# Patient Record
Sex: FEMALE | Race: WHITE | Employment: PART TIME | URBAN - METROPOLITAN AREA
[De-identification: names, ages, dates, MRNs, and addresses within clinical notes are randomized per-mention and may not be internally consistent; named-entity substitution may affect disease eponyms.]

---

## 2018-08-22 ENCOUNTER — OFFICE VISIT (OUTPATIENT)
Dept: FAMILY MEDICINE CLINIC | Age: 58
End: 2018-08-22
Payer: COMMERCIAL

## 2018-08-22 ENCOUNTER — OFFICE VISIT (OUTPATIENT)
Dept: PODIATRY | Age: 58
End: 2018-08-22
Payer: COMMERCIAL

## 2018-08-22 ENCOUNTER — HOSPITAL ENCOUNTER (OUTPATIENT)
Age: 58
Discharge: HOME | End: 2018-08-22
Payer: COMMERCIAL

## 2018-08-22 VITALS
RESPIRATION RATE: 16 BRPM | WEIGHT: 152.8 LBS | HEART RATE: 62 BPM | TEMPERATURE: 97.9 F | DIASTOLIC BLOOD PRESSURE: 49 MMHG | BODY MASS INDEX: 22.63 KG/M2 | HEIGHT: 69 IN | SYSTOLIC BLOOD PRESSURE: 107 MMHG

## 2018-08-22 VITALS — WEIGHT: 150 LBS | BODY MASS INDEX: 22.22 KG/M2 | HEIGHT: 69 IN

## 2018-08-22 DIAGNOSIS — E03.9 HYPOTHYROIDISM, UNSPECIFIED TYPE: ICD-10-CM

## 2018-08-22 DIAGNOSIS — M79.671 PAIN IN BOTH FEET: ICD-10-CM

## 2018-08-22 DIAGNOSIS — Z23 NEED FOR SHINGLES VACCINE: ICD-10-CM

## 2018-08-22 DIAGNOSIS — M92.61 HAGLUND'S DEFORMITY OF BOTH HEELS: Primary | ICD-10-CM

## 2018-08-22 DIAGNOSIS — M92.62 HAGLUND'S DEFORMITY OF BOTH HEELS: Primary | ICD-10-CM

## 2018-08-22 DIAGNOSIS — Z11.59 NEED FOR HEPATITIS C SCREENING TEST: ICD-10-CM

## 2018-08-22 DIAGNOSIS — Z76.89 ENCOUNTER TO ESTABLISH CARE: ICD-10-CM

## 2018-08-22 DIAGNOSIS — M79.672 PAIN IN BOTH FEET: ICD-10-CM

## 2018-08-22 DIAGNOSIS — Z11.4 SCREENING FOR HUMAN IMMUNODEFICIENCY VIRUS WITHOUT PRESENCE OF RISK FACTORS: ICD-10-CM

## 2018-08-22 DIAGNOSIS — Z11.4 SCREENING FOR HUMAN IMMUNODEFICIENCY VIRUS WITHOUT PRESENCE OF RISK FACTORS: Primary | ICD-10-CM

## 2018-08-22 LAB
HEPATITIS C ANTIBODY: NONREACTIVE
HIV AG/AB: NONREACTIVE
TSH SERPL DL<=0.05 MIU/L-ACNC: 2.67 MIU/L (ref 0.3–5)

## 2018-08-22 PROCEDURE — 99204 OFFICE O/P NEW MOD 45 MIN: CPT | Performed by: PHYSICIAN ASSISTANT

## 2018-08-22 PROCEDURE — G8420 CALC BMI NORM PARAMETERS: HCPCS | Performed by: PODIATRIST

## 2018-08-22 PROCEDURE — 1036F TOBACCO NON-USER: CPT | Performed by: PHYSICIAN ASSISTANT

## 2018-08-22 PROCEDURE — G8420 CALC BMI NORM PARAMETERS: HCPCS | Performed by: PHYSICIAN ASSISTANT

## 2018-08-22 PROCEDURE — G8427 DOCREV CUR MEDS BY ELIG CLIN: HCPCS | Performed by: PHYSICIAN ASSISTANT

## 2018-08-22 PROCEDURE — 86803 HEPATITIS C AB TEST: CPT

## 2018-08-22 PROCEDURE — 3017F COLORECTAL CA SCREEN DOC REV: CPT | Performed by: PODIATRIST

## 2018-08-22 PROCEDURE — 36415 COLL VENOUS BLD VENIPUNCTURE: CPT

## 2018-08-22 PROCEDURE — G8427 DOCREV CUR MEDS BY ELIG CLIN: HCPCS | Performed by: PODIATRIST

## 2018-08-22 PROCEDURE — 99203 OFFICE O/P NEW LOW 30 MIN: CPT | Performed by: PODIATRIST

## 2018-08-22 PROCEDURE — G0444 DEPRESSION SCREEN ANNUAL: HCPCS | Performed by: PHYSICIAN ASSISTANT

## 2018-08-22 PROCEDURE — 87389 HIV-1 AG W/HIV-1&-2 AB AG IA: CPT

## 2018-08-22 PROCEDURE — 3017F COLORECTAL CA SCREEN DOC REV: CPT | Performed by: PHYSICIAN ASSISTANT

## 2018-08-22 PROCEDURE — 1036F TOBACCO NON-USER: CPT | Performed by: PODIATRIST

## 2018-08-22 PROCEDURE — 84443 ASSAY THYROID STIM HORMONE: CPT

## 2018-08-22 ASSESSMENT — PATIENT HEALTH QUESTIONNAIRE - PHQ9
SUM OF ALL RESPONSES TO PHQ QUESTIONS 1-9: 0
10. IF YOU CHECKED OFF ANY PROBLEMS, HOW DIFFICULT HAVE THESE PROBLEMS MADE IT FOR YOU TO DO YOUR WORK, TAKE CARE OF THINGS AT HOME, OR GET ALONG WITH OTHER PEOPLE: 0
6. FEELING BAD ABOUT YOURSELF - OR THAT YOU ARE A FAILURE OR HAVE LET YOURSELF OR YOUR FAMILY DOWN: 0
9. THOUGHTS THAT YOU WOULD BE BETTER OFF DEAD, OR OF HURTING YOURSELF: 0
5. POOR APPETITE OR OVEREATING: 0
4. FEELING TIRED OR HAVING LITTLE ENERGY: 0
8. MOVING OR SPEAKING SO SLOWLY THAT OTHER PEOPLE COULD HAVE NOTICED. OR THE OPPOSITE, BEING SO FIGETY OR RESTLESS THAT YOU HAVE BEEN MOVING AROUND A LOT MORE THAN USUAL: 0
1. LITTLE INTEREST OR PLEASURE IN DOING THINGS: 0
SUM OF ALL RESPONSES TO PHQ9 QUESTIONS 1 & 2: 0
3. TROUBLE FALLING OR STAYING ASLEEP: 0
2. FEELING DOWN, DEPRESSED OR HOPELESS: 0
SUM OF ALL RESPONSES TO PHQ QUESTIONS 1-9: 0
7. TROUBLE CONCENTRATING ON THINGS, SUCH AS READING THE NEWSPAPER OR WATCHING TELEVISION: 0

## 2018-08-22 ASSESSMENT — ENCOUNTER SYMPTOMS
SINUS PRESSURE: 0
COUGH: 0
SORE THROAT: 0
ABDOMINAL PAIN: 0
ABDOMINAL DISTENTION: 0
EYE REDNESS: 0
VOMITING: 0
DIARRHEA: 0
BACK PAIN: 0
NAUSEA: 0
COLOR CHANGE: 0
PHOTOPHOBIA: 0
SHORTNESS OF BREATH: 0
BLOOD IN STOOL: 0
CHEST TIGHTNESS: 0
CONSTIPATION: 0
WHEEZING: 0

## 2018-08-22 NOTE — PROGRESS NOTES
1960    HIV screen  11/20/1977    DTaP/Tdap/Td vaccine (1 - Tdap) 11/20/1981    Cervical cancer screen  11/20/1983    Lipid screen  11/20/2002    Breast cancer screen  11/20/2012    Shingles Vaccine (1 of 2 - 2 Dose Series) 11/20/2012    Colon cancer screen colonoscopy  11/20/2012       Allergies   Allergen Reactions    Terconazole          Current Outpatient Prescriptions   Medication Sig Dispense Refill    zoster recombinant adjuvanted vaccine (SHINGRIX) 50 MCG SUSR injection Inject 0.5 mLs into the muscle once for 1 dose 0.5 mL 0     No current facility-administered medications for this visit. Social History   Substance Use Topics    Smoking status: Never Smoker    Smokeless tobacco: Never Used    Alcohol use Yes       History reviewed. No pertinent family history. ______________________________________________________________________  Review of Systems   Constitutional: Negative for activity change, appetite change, chills, diaphoresis, fatigue, fever and unexpected weight change. HENT: Negative for congestion, sinus pressure and sore throat. Eyes: Negative for photophobia and redness. Respiratory: Negative for cough, chest tightness, shortness of breath and wheezing. Cardiovascular: Negative for chest pain, palpitations and leg swelling. Gastrointestinal: Negative for abdominal distention, abdominal pain, blood in stool, constipation, diarrhea, nausea and vomiting. Endocrine: Negative. Genitourinary: Negative for decreased urine volume, difficulty urinating, dysuria, frequency, hematuria and urgency. Musculoskeletal: Negative for back pain and gait problem. Skin: Negative for color change, pallor and rash. Neurological: Negative for dizziness, syncope, weakness, light-headedness, numbness and headaches. Hematological: Negative for adenopathy. Psychiatric/Behavioral: Negative for confusion, decreased concentration, hallucinations and sleep disturbance. been identified and corrected by editing.

## 2018-08-22 NOTE — PROGRESS NOTES
Visit Information    Have you changed or started any medications since your last visit including any over-the-counter medicines, vitamins, or herbal medicines? no   Are you having any side effects from any of your medications? -  no  Have you stopped taking any of your medications? Is so, why? -  no    Have you seen any other physician or provider since your last visit? No  Have you had any other diagnostic tests since your last visit? No  Have you been seen in the emergency room and/or had an admission to a hospital since we last saw you? No  Have you had your routine dental cleaning in the past 6 months? yes -     Have you activated your [employer] account? If not, what are your barriers?  No:      Patient Care Team:  Regina Adrian, 51[address] Kim as PCP - General (Physician Assistant)    Medical History Review  Past Medical, Family, and Social History reviewed and does not contribute to the patient presenting condition    Health Maintenance   Topic Date Due    Hepatitis C screen  1960    HIV screen  11/20/1977    DTaP/Tdap/Td vaccine (1 - Tdap) 11/20/1981    Cervical cancer screen  11/20/1983    Lipid screen  11/20/2002    Breast cancer screen  11/20/2012    Shingles Vaccine (1 of 2 - 2 Dose Series) 11/20/2012    Colon cancer screen colonoscopy  11/20/2012    Flu vaccine (1) 09/01/2018

## 2018-08-27 NOTE — PROGRESS NOTES
[address] [facility] [address] [facility]  [address]  Dept: [phone]    NEW PATIENT PROGRESS NOTE  Date of patient's visit: 8/27/2018  Patient's Name:  Estela Maharaj YOB: 1960            Patient Care Team:  Xu Bowman MD as PCP - General (Internal Medicine)  Taya Lei DPM as Consulting Physician (Podiatry)        Chief Complaint   Patient presents with    New Patient     heel pain         HPI: Estela Maharaj is a 56 y.o. female who presents to the office today complaining of pain to the back of both of her heels. Symptoms began 2 month(s) ago. Patient relates pain is Present. Pain is rated 5 out of 10 and is described as constant. Treatments prior to today's visit include: pads and changing shoes. States it hurts when walking down stairs and when doing heel lifts. States it hurts to drive when her heel is resting . Currently denies F/C/N/V. Allergies   Allergen Reactions    Terconazole        Past Medical History:   Diagnosis Date    Hypothyroidism 8/22/2018       Prior to Admission medications    Not on File       History reviewed. No pertinent surgical history. History reviewed. No pertinent family history. Social History   Substance Use Topics    Smoking status: Never Smoker    Smokeless tobacco: Never Used    Alcohol use Yes       Review of Systems  Review of Systems - History obtained from chart review and the patient  General ROS: negative for - chills, fatigue, fever, night sweats or weight gain  Constitutional: Negative for chills, diaphoresis, fatigue, fever and unexpected weight change. Musculoskeletal: Positive for arthralgias, gait problem and joint swelling. Neurological ROS: negative for - behavioral changes, confusion, headaches or seizures. Negative for weakness and numbness. Dermatological ROS: negative for - mole changes, rash  Cardiovascular: Negative for leg swelling.    Gastrointestinal: Negative for constipation, diarrhea, nausea and vomiting. Lower Extremity Physical Examination:     Vitals: There were no vitals filed for this visit. General: AAO x 3 in NAD. Dermatologic Exam:  Skin lesion/ulceration Absent . Skin No rashes or nodules noted. .       Musculoskeletal:     1st MPJ ROM decreased, Bilateral.  Muscle strength 5/5, Bilateral.  Pain present upon palpation of  Right and left posterio rheel. Pain increased w Dorsiflexion of ankle joint nazanin. Medial longitudinal arch, Bilateral WNL. Ankle ROM WNL,Bilateral.    Dorsally contracted digits absent digits 1-5 Bilateral.     Vascular: DP and PT pulses palpable 2/4, Bilateral.  CFT <3 seconds, Bilateral.  Hair growth present to the level of the digits, Bilateral.  Edema absent, Bilateral.  Varicosities absent, Bilateral. Erythema absent, Bilateral    Neurological: Sensation intact to light touch to level of digits, Bilateral.  Protective sensation intact 10/10 sites via 5.07/10g [place] Monofilament, Bilateral.  negative Tinel's, Bilateral.  negative Valleix sign, Bilateral.      Integument: Warm, dry, supple, Bilateral.  Open lesion absent, Bilateral.  Interdigital maceration absent to web spaces 1-4, Bilateral.  Nails are normal in length, thickness and color 1-5 bilateral.  Fissures absent, Bilateral.     Radiographs:  3 views right and left  foot: large bony prominence noted with thickening of achilles tendon. Small posterior spur both feet noted on lateral    Asessment: Patient is a 56 y.o. female with:    Diagnosis Orders   1. Mason's deformity of both heels  Ambulatory referral to Physical Therapy   2. Pain in both feet  Ambulatory referral to Physical Therapy       Plan: Patient examined and evaluated. Current condition and treatment options discussed in detail. Discussed conservative and surgical options with the patient. Advised pt to her condtino.   rx to start dry needling at physical therapy  X rays 3 views both feet show the above results. .  Verbal and written instructions given to patient. Contact office with any questions/problems/concerns. RTC in 2week(s).   Heel lifts added to shoes tdoay 1/4 in  8/22/2018    Electronically signed by Vasu Rao DPM on 8/27/2018 at 1:33 PM  8/22/2018

## 2018-09-10 ENCOUNTER — HOSPITAL ENCOUNTER (OUTPATIENT)
Dept: PHYSICAL THERAPY | Facility: CLINIC | Age: 58
Setting detail: THERAPIES SERIES
Discharge: HOME | End: 2018-09-10
Payer: COMMERCIAL

## 2018-09-10 PROCEDURE — 97110 THERAPEUTIC EXERCISES: CPT

## 2018-09-10 PROCEDURE — 97140 MANUAL THERAPY 1/> REGIONS: CPT

## 2018-09-10 PROCEDURE — 97161 PT EVAL LOW COMPLEX 20 MIN: CPT

## 2018-09-10 NOTE — CONSULTS
[] [] []   Standing [] [x] []   Ambulation [] [x] []   Groom/Dress [] [] []   Lift/Carry [] [] []   Stairs [] [] []   Bending [] [x] []   Squat [] [x] []   Kneel [] [] []         Comments:  Assessment:  Problems:    [x] ? Pain: B heels   [x] ? ROM:B hips, calves    [x] ? Strength: B hips   [x] ? Function: incr pain/difficulty w/ walking, standing, steps, squatting, Adv ADLs   [] ? Balance  [] Edema:  [] Postural Deviations  [x] Gait Deviations  [x] Other: LEFS-66%      STG: (to be met in 12 treatments)  1. ? Pain: 2/10 w/ standing/walking  2. ? ROM: R ankle WNL  3. ? Strength: Bilat hips grossly 5/5 to allow for incr ease w/ squats, steps and gait  4. ? Function: Able ambulate community distances w/o incr pain or difficulty   5. Independent with Home Exercise Programs  6. Incr LEFS by 20% for improved function                 Patient goals: Pain relief and tightness of R leg      Rehab Potential:  [x] Good  [] Fair  [] Poor   Suggested Professional Referral:  [x] No  [] Yes:  Barriers to Goal Achievement[de-identified]  [x] No  [] Yes:  Domestic Concerns:  [x] No  [] Yes:    Pt. Education:  [x] Plans/Goals, Risks/Benefits discussed  [x] Home exercise program    Method of Education: [x] Verbal  [x] Demo  [x] Written  Comprehension of Education:  [x] Verbalizes understanding. [] Demonstrates understanding. [] Needs Review. [] Demonstrates/verbalizes understanding of HEP/Ed previously given.     Treatment Plan:  [x] Therapeutic Exercise    [x] Modalities:prn  [x] Dry Needling  [x] Therapeutic Activity    [] Ultrasound  [] Electrical Stimulation  [] Gait Training     [] Massage       [] Lumbar/Cervical Traction  [x] Neuromuscular Re-education [x] Cold/hotpack [] Iontophoresis: 4 mg/mL  [x] Instruction in HEP             Dexamethasone Sodium  [x] Manual Therapy             Phosphate 40-80 mAmin  [] Aquatic Therapy  [x] Vasocompression/    [] Other:       Game Ready    []  Medication allergies reviewed for use of    Dexamethasone

## 2018-09-13 ENCOUNTER — HOSPITAL ENCOUNTER (OUTPATIENT)
Dept: PHYSICAL THERAPY | Facility: CLINIC | Age: 58
Setting detail: THERAPIES SERIES
Discharge: HOME | End: 2018-09-13
Payer: COMMERCIAL

## 2018-09-13 PROCEDURE — 97110 THERAPEUTIC EXERCISES: CPT

## 2018-09-13 PROCEDURE — 97140 MANUAL THERAPY 1/> REGIONS: CPT

## 2018-09-13 NOTE — FLOWSHEET NOTE
[] [facility]       Outpatient Physical        Therapy       [address]       Phone: [phone]       Fax: [phone] [] [facility] at [address]       Phone: [phone]       Fax: [phone] [x] [place]. [facility]   Phone: [phone]   Fax:  [phone]     Physical Therapy Daily Treatment Note    Date:  2018  Patient Name:  Aimee Sepulveda    :  1960  MRN: [id number]  Physician: [address] [facility] [address]: [facility] 20 visit hard max  Medical Diagnosis: Mason's deformity of both heels, Pain in both feet                 Rehab Codes: M92.61, M92.62, M79.671, M79.672   Onset date:                     Next Dr's appt.: prn  Visit# / total visits:   Cancels/No Shows: 2    Subjective:  Pt reports she was sore after last visit not to bad today as didn't have to stand a lot at work. Reports compliance w/ HEP and did yoga for first time in a while and felt tight. Pain:  [x] Yes  [] No Location: B heels Pain Rating: (0-10 scale) 3-4/10  Pain altered Tx:  [] No  [x] Yes  Action:  Comments:    Objective:  Modalities: prn  Precautions:  Exercises:IDN B calf and heel, homeostatics. MFR to bilat achilles, calf  Exercise Reps/ Time Weight/ Level Comments         HS stretch 3x 30 sec  w/ rope   Calf stretch 3x ea 30 sec  w/ towel and at wall   ABC 1x     Circles-cw, ccw 20x ea     Seated heel, toe raises 10x2 ea     Toe crunches 10x ea  HEP         Ankle Tband      Baps      Other:    Specific Instructions for next treatment:asses response to IDN, add cupping, manual prn and progress strength and ROM as jacinto.      Treatment Charges: Mins Units   []  Modalities     [x]  Ther Exercise 15 1   [x]  Manual Therapy 30 2   []  Ther Activities     []  Aquatics     []  Vasocompression     []  Other     Total Treatment time 45 3       Assessment: [x] Progressing

## 2018-09-18 ENCOUNTER — HOSPITAL ENCOUNTER (OUTPATIENT)
Dept: PHYSICAL THERAPY | Facility: CLINIC | Age: 58
Setting detail: THERAPIES SERIES
Discharge: HOME | End: 2018-09-18
Payer: COMMERCIAL

## 2018-09-18 PROCEDURE — 97140 MANUAL THERAPY 1/> REGIONS: CPT

## 2018-09-18 PROCEDURE — 97110 THERAPEUTIC EXERCISES: CPT

## 2018-09-18 PROCEDURE — 97112 NEUROMUSCULAR REEDUCATION: CPT

## 2018-09-18 NOTE — FLOWSHEET NOTE
Progressing toward goals. [] No change. [x] Other: IDN w/ incr dosage this date to B LE's followed by manual to bilat calf. Added trial of taping to R calf this date, educated pt on use and removal of tape. Completed therex per log w/ progressions noted w/ good jacinto. No incr pain or soreness noted after RX. Issued red tband and HO of new ex for HEP. Will progress as jacinto. STG: (to be met in 12 treatments)  1. ? Pain: 2/10 w/ standing/walking  2. ? ROM: R ankle WNL  3. ? Strength: Bilat hips grossly 5/5 to allow for incr ease w/ squats, steps and gait  4. ? Function: Able ambulate community distances w/o incr pain or difficulty   5. Independent with Home Exercise Programs  6. Incr LEFS by 20% for improved function    Pt. Education:  [x] Yes  [] No  [x] Reviewed Prior HEP/Ed  Method of Education: [x] Verbal  [x] Demo  [x] Written  Comprehension of Education:  [x] Verbalizes understanding. [] Demonstrates understanding. [] Needs review. [] Demonstrates/verbalizes HEP/Ed previously given. Plan: [x] Continue per plan of care.    [] Other:      Time In: 4:00 pm            Time Out: 5:00 pm    Electronically signed by:  Estrellita Roy, PT

## 2018-09-20 ENCOUNTER — HOSPITAL ENCOUNTER (OUTPATIENT)
Dept: PHYSICAL THERAPY | Facility: CLINIC | Age: 58
Setting detail: THERAPIES SERIES
Discharge: HOME | End: 2018-09-20
Payer: COMMERCIAL

## 2018-09-20 PROCEDURE — 97140 MANUAL THERAPY 1/> REGIONS: CPT

## 2018-09-20 PROCEDURE — 97110 THERAPEUTIC EXERCISES: CPT

## 2018-09-20 PROCEDURE — 97112 NEUROMUSCULAR REEDUCATION: CPT

## 2018-09-20 NOTE — FLOWSHEET NOTE
Vasocompression     [x]  Other NMR 10 1   Total Treatment time 55 4       Assessment: [x] Progressing toward goals. [] No change. [x] Other: IDN w/ incr dosage this date to B LE's followed by manual to bilat calf. Taping to R calf as prev, followed by therex per log w/ progressions noted, jacinto well. No incr pain or soreness noted after RX. Will cont to progress as jacinto. STG: (to be met in 12 treatments)  1. ? Pain: 2/10 w/ standing/walking  2. ? ROM: R ankle WNL  3. ? Strength: Bilat hips grossly 5/5 to allow for incr ease w/ squats, steps and gait  4. ? Function: Able ambulate community distances w/o incr pain or difficulty   5. Independent with Home Exercise Programs  6. Incr LEFS by 20% for improved function    Pt. Education:  [x] Yes  [] No  [x] Reviewed Prior HEP/Ed  Method of Education: [x] Verbal  [x] Demo  [x] Written  Comprehension of Education:  [x] Verbalizes understanding. [] Demonstrates understanding. [] Needs review. [] Demonstrates/verbalizes HEP/Ed previously given. Plan: [x] Continue per plan of care.    [] Other:      Time In: 5:00 pm            Time Out: 6:00 pm    Electronically signed by:  Tree Galaviz, PT

## 2018-09-25 ENCOUNTER — HOSPITAL ENCOUNTER (OUTPATIENT)
Dept: PHYSICAL THERAPY | Facility: CLINIC | Age: 58
Setting detail: THERAPIES SERIES
Discharge: HOME | End: 2018-09-25
Payer: COMMERCIAL

## 2018-09-25 PROCEDURE — 97140 MANUAL THERAPY 1/> REGIONS: CPT

## 2018-09-25 PROCEDURE — 97110 THERAPEUTIC EXERCISES: CPT

## 2018-09-25 PROCEDURE — 97112 NEUROMUSCULAR REEDUCATION: CPT

## 2018-09-25 NOTE — FLOWSHEET NOTE
[]  Vasocompression     [x]  Other NMR 10 1   Total Treatment time 55 4       Assessment: [x] Progressing toward goals. [] No change. [x] Other: IDN w/ incr dosage this date to B LE's followed by manual to bilat calf. Taping to R calf as prev, followed by therex per log w/ progressions noted, jacinto well. No incr pain or soreness noted after RX. Will cont to progress as jacinto. STG: (to be met in 12 treatments)  1. ? Pain: 2/10 w/ standing/walking  2. ? ROM: R ankle WNL  3. ? Strength: Bilat hips grossly 5/5 to allow for incr ease w/ squats, steps and gait  4. ? Function: Able ambulate community distances w/o incr pain or difficulty   5. Independent with Home Exercise Programs  6. Incr LEFS by 20% for improved function    Pt. Education:  [x] Yes  [] No  [x] Reviewed Prior HEP/Ed  Method of Education: [x] Verbal  [x] Demo  [] Written  Comprehension of Education:  [x] Verbalizes understanding. [] Demonstrates understanding. [] Needs review. [] Demonstrates/verbalizes HEP/Ed previously given. Plan: [x] Continue per plan of care.    [] Other:      Time In: 4:00 pm            Time Out: 5:00 pm    Electronically signed by:  Christopher Thornton PT

## 2018-09-27 ENCOUNTER — HOSPITAL ENCOUNTER (OUTPATIENT)
Dept: PHYSICAL THERAPY | Facility: CLINIC | Age: 58
Setting detail: THERAPIES SERIES
Discharge: HOME | End: 2018-09-27
Payer: COMMERCIAL

## 2018-09-27 PROCEDURE — 97110 THERAPEUTIC EXERCISES: CPT

## 2018-09-27 PROCEDURE — 97112 NEUROMUSCULAR REEDUCATION: CPT

## 2018-09-27 PROCEDURE — 97140 MANUAL THERAPY 1/> REGIONS: CPT

## 2018-09-27 NOTE — FLOWSHEET NOTE
Vasocompression     [x]  Other NMR 10 1   Total Treatment time 55 4       Assessment: [x] Progressing toward goals. [] No change. [x] Other: IDN to B LE's followed by manual to bilat calf. Decr tension noted overall bilat calf and peroneals and no sig TTP except R insertion at heel. Taping to R calf as prev, followed by therex per log w/ progressions noted, jacinto well. No incr pain or soreness noted after RX. Will cont to progress as jacinto. STG: (to be met in 12 treatments)  1. ? Pain: 2/10 w/ standing/walking  2. ? ROM: R ankle WNL  3. ? Strength: Bilat hips grossly 5/5 to allow for incr ease w/ squats, steps and gait  4. ? Function: Able ambulate community distances w/o incr pain or difficulty   5. Independent with Home Exercise Programs  6. Incr LEFS by 20% for improved function    Pt. Education:  [x] Yes  [] No  [x] Reviewed Prior HEP/Ed  Method of Education: [x] Verbal  [x] Demo  [] Written  Comprehension of Education:  [x] Verbalizes understanding. [] Demonstrates understanding. [] Needs review. [] Demonstrates/verbalizes HEP/Ed previously given. Plan: [x] Continue per plan of care.    [] Other:      Time In: 4:00 pm            Time Out: 5:00 pm    Electronically signed by:  Sheria Goldberg, PT

## 2018-10-02 ENCOUNTER — HOSPITAL ENCOUNTER (OUTPATIENT)
Dept: PHYSICAL THERAPY | Facility: CLINIC | Age: 58
Setting detail: THERAPIES SERIES
Discharge: HOME | End: 2018-10-02
Payer: COMMERCIAL

## 2018-10-02 PROCEDURE — 97140 MANUAL THERAPY 1/> REGIONS: CPT

## 2018-10-02 PROCEDURE — 97110 THERAPEUTIC EXERCISES: CPT

## 2018-10-04 ENCOUNTER — HOSPITAL ENCOUNTER (OUTPATIENT)
Dept: PHYSICAL THERAPY | Facility: CLINIC | Age: 58
Setting detail: THERAPIES SERIES
Discharge: HOME | End: 2018-10-04
Payer: COMMERCIAL

## 2018-10-04 NOTE — FLOWSHEET NOTE
[] [facility]) [facility] &  Therapy  [address]  [phone]  F: [phone] [] [address] [facility] [address]   [address]  P: [phone]  F: [phone] [] [place]  [address] [facility]  P: [phone]  F: [phone] [] [address]  [facility]   [address]   [place]: [phone]  F: [phone]        Physical Therapy Cancel/No Show note    Date: 10/4/2018  Patient: Day Mckeon  : 1960  MRN: [id number]    Cancels/No Shows to date: 1cx    For today's appointment patient:  [x]  Cancelled  []  Rescheduled appointment  []  No-show     Reason given by patient:  []  Patient ill  [x]  Conflicting appointment  []  No transportation    []  Conflict with work  []  No reason given  []  Weather related  []  Other:      Comments:      []  Next appointment was confirmed    Electronically signed by: January Resendiz PT

## 2018-10-09 ENCOUNTER — APPOINTMENT (OUTPATIENT)
Dept: PHYSICAL THERAPY | Facility: CLINIC | Age: 58
End: 2018-10-09
Payer: COMMERCIAL

## 2018-10-18 ENCOUNTER — HOSPITAL ENCOUNTER (OUTPATIENT)
Dept: PHYSICAL THERAPY | Facility: CLINIC | Age: 58
Setting detail: THERAPIES SERIES
End: 2018-10-18
Payer: COMMERCIAL

## 2018-10-25 ENCOUNTER — HOSPITAL ENCOUNTER (OUTPATIENT)
Dept: PHYSICAL THERAPY | Facility: CLINIC | Age: 58
Setting detail: THERAPIES SERIES
Discharge: HOME | End: 2018-10-25
Payer: COMMERCIAL

## 2019-04-22 ENCOUNTER — OFFICE VISIT (OUTPATIENT)
Dept: FAMILY MEDICINE CLINIC | Age: 59
End: 2019-04-22
Payer: COMMERCIAL

## 2019-04-22 VITALS
DIASTOLIC BLOOD PRESSURE: 68 MMHG | HEART RATE: 59 BPM | BODY MASS INDEX: 23.7 KG/M2 | HEIGHT: 69 IN | WEIGHT: 160 LBS | RESPIRATION RATE: 16 BRPM | TEMPERATURE: 98.7 F | SYSTOLIC BLOOD PRESSURE: 119 MMHG

## 2019-04-22 DIAGNOSIS — M79.10 MYALGIA: Primary | ICD-10-CM

## 2019-04-22 DIAGNOSIS — Z78.0 MENOPAUSE: ICD-10-CM

## 2019-04-22 PROBLEM — E03.9 HYPOTHYROIDISM: Status: RESOLVED | Noted: 2018-08-22 | Resolved: 2019-04-22

## 2019-04-22 PROCEDURE — G8420 CALC BMI NORM PARAMETERS: HCPCS | Performed by: INTERNAL MEDICINE

## 2019-04-22 PROCEDURE — G8427 DOCREV CUR MEDS BY ELIG CLIN: HCPCS | Performed by: INTERNAL MEDICINE

## 2019-04-22 PROCEDURE — 99213 OFFICE O/P EST LOW 20 MIN: CPT | Performed by: INTERNAL MEDICINE

## 2019-04-22 PROCEDURE — 1036F TOBACCO NON-USER: CPT | Performed by: INTERNAL MEDICINE

## 2019-04-22 PROCEDURE — 3017F COLORECTAL CA SCREEN DOC REV: CPT | Performed by: INTERNAL MEDICINE

## 2019-04-22 ASSESSMENT — PATIENT HEALTH QUESTIONNAIRE - PHQ9
2. FEELING DOWN, DEPRESSED OR HOPELESS: 0
1. LITTLE INTEREST OR PLEASURE IN DOING THINGS: 0
SUM OF ALL RESPONSES TO PHQ QUESTIONS 1-9: 0
SUM OF ALL RESPONSES TO PHQ QUESTIONS 1-9: 0
SUM OF ALL RESPONSES TO PHQ9 QUESTIONS 1 & 2: 0

## 2019-04-22 NOTE — PROGRESS NOTES
[facility]    Progress Note    Date of patient's visit: 4/22/2019    Patient's Name:  Maia Miller    YOB: 1960            Patient Care Team:  Nguyen Long MD as PCP - General (Internal Medicine)  Sharlet Primrose, DPM as Consulting Physician (Podiatry)    REASON FOR VISIT: Routine outpatient follow     Chief Complaint   Patient presents with   [facility]    Hand Pain     right thumb twitching     Fatigue    Generalized Body Aches         HISTORY OF PRESENT ILLNESS:    History was obtained from the patient. Maia Miller is a 66 y.o. is here for generalized fatigue and achy feeling. She was also having some twitching in the right thumb a few weeks ago which has now resolved. She says she was taking a lot of cod liver oil and vitamin D supplementations along with other supplements which were recommended in the past.    She was also told she had an abnormal thyroid in the past though her last 2 TSH have been normal.  She had several labs done last year in June which were reviewed which were all normal.  She has been menopausal since last July though had one or 2 episodes of spotting in between. She follows up with her gynecologist.  She has some occasional hot flashes. LMP: last July    Past Medical History:   Diagnosis Date    Hypothyroidism 8/22/2018       No past surgical history on file. ALLERGIES      Allergies   Allergen Reactions    Terconazole        MEDICATIONS:      No current outpatient medications on file prior to visit. No current facility-administered medications on file prior to visit. SOCIAL HISTORY    Reviewed and no change from previous record. Don Gonzalez  reports that she has never smoked.  She has never used smokeless tobacco.    FAMILY HISTORY:    Reviewed and No change from previous visit    HEALTH MAINTENANCE DUE:      Health Maintenance Due   Topic Date Due    DTaP/Tdap/Td vaccine (1 - Tdap) 11/20/1981    Cervical cancer screen  11/20/1983    Lipid screen  11/20/2002    Shingles Vaccine (1 of 2) 11/20/2012    Breast cancer screen  07/19/2018       REVIEW OF SYSTEMS:    12 point review of symptoms completed and found to be normal except noted in the HPI    Review of Systems   Constitutional: Positive for fatigue. Negative for chills and fever. Eyes: Negative for pain, redness and visual disturbance. Respiratory: Negative for cough, shortness of breath and wheezing. Cardiovascular: Negative for chest pain, palpitations and leg swelling. Gastrointestinal: Negative for abdominal pain, constipation and diarrhea. Endocrine: Positive for heat intolerance. Negative for polydipsia and polyuria. Genitourinary: Positive for menstrual problem. Negative for dysuria and frequency. Musculoskeletal: Positive for arthralgias and myalgias. Negative for back pain, joint swelling and neck pain. Skin: Negative for color change and rash. Neurological: Negative for dizziness, tremors, seizures, syncope, weakness, numbness and headaches. Psychiatric/Behavioral: Negative for dysphoric mood. The patient is nervous/anxious. PHYSICAL EXAM:     Vitals:    04/22/19 1543   BP: 119/68   Site: Right Upper Arm   Position: Sitting   Cuff Size: Medium Adult   Pulse: 59   Resp: 16   Temp: 98.7 °F (37.1 °C)   TempSrc: Oral   Weight: 160 lb (72.6 kg)   Height: 5' 9.02\" (1.753 m)     Body mass index is 23.62 kg/m². BP Readings from Last 3 Encounters:   04/22/19 119/68   08/22/18 (!) 107/49        Wt Readings from Last 3 Encounters:   04/22/19 160 lb (72.6 kg)   08/22/18 150 lb (68 kg)   08/22/18 152 lb 12.8 oz (69.3 kg)       Physical Exam   Constitutional: She is oriented to person, place, and time. She appears well-developed and well-nourished. No distress. HENT:   Head: Normocephalic and atraumatic.    Mouth/Throat: Oropharynx is clear and moist.   Eyes: Pupils are equal, round, and reactive to light. Conjunctivae and EOM are normal.   Neck: Normal range of motion. Neck supple. No thyromegaly present. Cardiovascular: Normal rate and regular rhythm. No murmur heard. Pulmonary/Chest: Effort normal and breath sounds normal. She has no wheezes. Musculoskeletal: She exhibits no edema. Neurological: She is alert and oriented to person, place, and time. She displays normal reflexes. No sensory deficit. Coordination normal.   Skin: Skin is warm and dry. No rash noted. She is not diaphoretic. Psychiatric: She has a normal mood and affect. Nursing note and vitals reviewed. LABORATORY FINDINGS:    CBC:No results found for: WBC, HGB, PLT  BMP:  No results found for: NA, K, CL, CO2, BUN, CREATININE, GLUCOSE  HEMOGLOBIN A1C: No results found for: LABA1C  MICROALBUMIN URINE: No results found for: MICROALBUR  FASTING LIPID [email or web address]  No results found for: LDLCALC, LDLCHOLESTEROL, LDLDIRECT    LIVER PROFILE:No results found for: ALT, AST, PROT, BILITOT, BILIDIR, LABALBU   THYROID FUNCTION:   Lab Results   Component Value Date    TSH 2.67 08/22/2018      URINEANALYSIS: No results found for: LABURIN  ASSESSMENT AND PLAN:    1. Myalgia    - Comprehensive Metabolic Panel; Future  - Magnesium; Future  - CK; Future    2. Menopause  Follow up with gyn            FOLLOW UP AND INSTRUCTIONS:   Return in about 6 months (around 10/22/2019). 1. [employer] [employer] received counseling on the following healthy behaviors: nutrition and exercise    2. Reviewed prior labs and health maintenance. 3. Discussed use, benefit, and side effects of prescribed medications. Barriers to medication compliance addressed. All patient questions answered. Pt voiced understanding.        Ann-Marie Noland  Attending Physician, [address] [place] [address], Internal Medicine Residency Program  [address] [place] [address]  4/22/2019, 4:09 PM

## 2019-04-22 NOTE — PROGRESS NOTES
Visit Information    Have you changed or started any medications since your last visit including any over-the-counter medicines, vitamins, or herbal medicines? no   Are you having any side effects from any of your medications? -  no  Have you stopped taking any of your medications? Is so, why? -  no    Have you seen any other physician or provider since your last visit? No  Have you had any other diagnostic tests since your last visit? No  Have you been seen in the emergency room and/or had an admission to a hospital since we last saw you? No  Have you had your routine dental cleaning in the past 6 months? yes -     Have you activated your [employer] account? If not, what are your barriers?  Yes     Patient Care Team:  Rosalva Madera MD as PCP - General (Internal Medicine)  Christopher Hernandez DPM as Consulting Physician (Podiatry)    Medical History Review  Past Medical, Family, and Social History reviewed and does not contribute to the patient presenting condition    Health Maintenance   Topic Date Due    DTaP/Tdap/Td vaccine (1 - Tdap) 11/20/1981    Cervical cancer screen  11/20/1983    Lipid screen  11/20/2002    Shingles Vaccine (1 of 2) 11/20/2012    Breast cancer screen  07/19/2018    TSH testing  08/22/2019    Flu vaccine (Season Ended) 09/01/2019    Colon cancer screen colonoscopy  09/16/2023    Hepatitis C screen  Completed    HIV screen  Completed    Pneumococcal 0-64 years Vaccine  Aged Out

## 2019-04-29 ASSESSMENT — ENCOUNTER SYMPTOMS
BACK PAIN: 0
CONSTIPATION: 0
EYE PAIN: 0
EYE REDNESS: 0
ABDOMINAL PAIN: 0
COLOR CHANGE: 0
WHEEZING: 0
SHORTNESS OF BREATH: 0
COUGH: 0
DIARRHEA: 0

## 2019-05-21 LAB
ALBUMIN SERPL-MCNC: 4.6 G/DL (ref 3.2–5.3)
ALK PHOSPHATASE: 74 U/L (ref 39–130)
ALT SERPL-CCNC: 17 U/L (ref 0–31)
ANION GAP SERPL CALCULATED.3IONS-SCNC: 8 MMOL/L (ref 4–12)
AST SERPL-CCNC: 24 U/L (ref 0–41)
BILIRUB SERPL-MCNC: 0.6 MG/DL (ref 0.3–1.2)
BUN BLDV-MCNC: 15 MG/DL (ref 5–23)
CALCIUM SERPL-MCNC: 9.9 MG/DL (ref 8.5–10.5)
CHLORIDE BLD-SCNC: 106 MMOL/L (ref 98–109)
CO2: 29 MMOL/L (ref 22–32)
CREAT SERPL-MCNC: 0.81 MG/DL (ref 0.4–1)
EGFR AFRICAN AMERICAN: >60 ML/MIN/1.73SQ.M
EGFR IF NONAFRICAN AMERICAN: >60 ML/MIN/1.73SQ.M
GLUCOSE: 87 MG/DL (ref 65–99)
MAGNESIUM: 2.3 MG/DL (ref 1.8–2.6)
POTASSIUM SERPL-SCNC: 4.1 MMOL/L (ref 3.5–5)
SODIUM BLD-SCNC: 143 MMOL/L (ref 134–146)
TOTAL CK: 108 U/L (ref 24–170)
TOTAL PROTEIN: 7.3 G/DL (ref 6–8)

## 2019-07-08 ENCOUNTER — OFFICE VISIT (OUTPATIENT)
Dept: FAMILY MEDICINE CLINIC | Age: 59
End: 2019-07-08
Payer: COMMERCIAL

## 2019-07-08 VITALS
TEMPERATURE: 97.6 F | HEIGHT: 70 IN | BODY MASS INDEX: 22.19 KG/M2 | OXYGEN SATURATION: 97 % | WEIGHT: 155 LBS | SYSTOLIC BLOOD PRESSURE: 110 MMHG | DIASTOLIC BLOOD PRESSURE: 63 MMHG | HEART RATE: 61 BPM

## 2019-07-08 DIAGNOSIS — Z13.29 SCREENING FOR THYROID DISORDER: ICD-10-CM

## 2019-07-08 DIAGNOSIS — Z82.49 FAMILY HISTORY OF CORONARY ARTERY DISEASE: ICD-10-CM

## 2019-07-08 DIAGNOSIS — K21.9 GASTROESOPHAGEAL REFLUX DISEASE, ESOPHAGITIS PRESENCE NOT SPECIFIED: ICD-10-CM

## 2019-07-08 DIAGNOSIS — R05.9 COUGH: ICD-10-CM

## 2019-07-08 DIAGNOSIS — Z13.220 SCREENING FOR LIPID DISORDERS: ICD-10-CM

## 2019-07-08 DIAGNOSIS — R07.9 CHEST PAIN, UNSPECIFIED TYPE: Primary | ICD-10-CM

## 2019-07-08 PROCEDURE — G8420 CALC BMI NORM PARAMETERS: HCPCS | Performed by: PHYSICIAN ASSISTANT

## 2019-07-08 PROCEDURE — 1036F TOBACCO NON-USER: CPT | Performed by: PHYSICIAN ASSISTANT

## 2019-07-08 PROCEDURE — 93000 ELECTROCARDIOGRAM COMPLETE: CPT | Performed by: PHYSICIAN ASSISTANT

## 2019-07-08 PROCEDURE — G8427 DOCREV CUR MEDS BY ELIG CLIN: HCPCS | Performed by: PHYSICIAN ASSISTANT

## 2019-07-08 PROCEDURE — 99214 OFFICE O/P EST MOD 30 MIN: CPT | Performed by: PHYSICIAN ASSISTANT

## 2019-07-08 PROCEDURE — 3017F COLORECTAL CA SCREEN DOC REV: CPT | Performed by: PHYSICIAN ASSISTANT

## 2019-07-08 RX ORDER — RANITIDINE 150 MG/1
150 TABLET ORAL NIGHTLY
Qty: 90 TABLET | Refills: 0 | Status: SHIPPED | OUTPATIENT
Start: 2019-07-08 | End: 2019-08-06

## 2019-07-08 ASSESSMENT — ENCOUNTER SYMPTOMS
COUGH: 1
CHEST TIGHTNESS: 0
NAUSEA: 0
SPUTUM PRODUCTION: 0
EYE ITCHING: 0
SHORTNESS OF BREATH: 0
VOMITING: 0
RHINORRHEA: 0
EYE DISCHARGE: 0
BACK PAIN: 0
ORTHOPNEA: 0
HEMOPTYSIS: 0
SINUS PRESSURE: 0
ABDOMINAL PAIN: 0
DIARRHEA: 0
SORE THROAT: 0

## 2019-07-08 NOTE — PROGRESS NOTES
[address] [place] [address]  [address] [place] [address]  Dept: [phone]  Dept Fax: [phone]     Licha Olivas is a 66 y.o. female who presents today for her medical conditions/complaintsas noted below. Licha Olivas is c/o of   Chief Complaint   Patient presents with    Chest Pain     above the  breast area left side about 3 weeks mostly when bending   [address]     Dr Tanner Franklin         HPI:      Chest Pain    This is a new problem. The current episode started in the past 7 days. The pain does not radiate. Associated symptoms include a cough. Pertinent negatives include no abdominal pain, back pain, claudication, diaphoresis, dizziness, exertional chest pressure, fever, headaches, hemoptysis, irregular heartbeat, leg pain, lower extremity edema, malaise/fatigue, nausea, near-syncope, numbness, orthopnea, palpitations, PND, shortness of breath, sputum production, syncope, vomiting or weakness. No results found for: LABA1C          ( goal A1Cis < 7)   No results found for: LABMICR  No results found for: LDLCHOLESTEROL, LDLCALC    (goal LDL is <100)   AST (U/L)   Date Value   05/21/2019 24     ALT (U/L)   Date Value   05/21/2019 17     BUN (mg/dL)   Date Value   05/21/2019 15     BP Readings from Last 3 Encounters:   07/08/19 110/63   04/22/19 119/68   08/22/18 (!) 107/49          (goal 120/80)    Past Medical History:   Diagnosis Date    Hypothyroidism 8/22/2018      No past surgical history on file. No family history on file. Social History     Tobacco Use    Smoking status: Never Smoker    Smokeless tobacco: Never Used   Substance Use Topics    Alcohol use: Yes         Current Outpatient Medications   Medication Sig Dispense Refill    NONFORMULARY 1 tablet daily Indications: Copper      ranitidine (ZANTAC) 150 MG tablet Take 1 tablet by mouth nightly 90 tablet 0     No current facility-administered medications for this visit. normal. Right eye exhibits no discharge. Left eye exhibits no discharge. No scleral icterus. Neck: Normal range of motion. Neck supple. No tracheal deviation present. No thyromegaly present. Cardiovascular: Normal rate, regular rhythm and normal heart sounds. Exam reveals no gallop and no friction rub. No murmur heard. Pulmonary/Chest: Effort normal and breath sounds normal. No stridor. No respiratory distress. She has no wheezes. She has no rales. She exhibits no tenderness. Abdominal: Soft. Bowel sounds are normal. She exhibits no distension. There is no tenderness. There is no rebound and no guarding. Musculoskeletal: She exhibits no edema. Neurological: She is alert and oriented to person, place, and time. Gait normal.   Skin: Skin is warm and dry. No rash noted. She is not diaphoretic. Psychiatric: She has a normal mood and affect. Her affect is not inappropriate. Nursing note and vitals reviewed. /63   Pulse 61   Temp 97.6 °F (36.4 °C) (Oral)   Ht 5' 9.5\" (1.765 m)   Wt 155 lb (70.3 kg)   SpO2 97%   BMI 22.56 kg/m²     Assessment:          Diagnosis Orders   1. Chest pain, unspecified type  CBC Auto Differential    Comprehensive Metabolic Panel    EKG 12 Lead    XR CHEST STANDARD (2 VW)    ECHO Complete 2D W Doppler W Color    NM MYOCARDIAL SPECT REST EXERCISE OR RX   2. Cough  CBC Auto Differential    Comprehensive Metabolic Panel    EKG 12 Lead    XR CHEST STANDARD (2 VW)    ECHO Complete 2D W Doppler W Color    NM MYOCARDIAL SPECT REST EXERCISE OR RX   3. Screening for lipid disorders  Lipid Panel   4. Screening for thyroid disorder  TSH with Reflex   5. Gastroesophageal reflux disease, esophagitis presence not specified  H. Pylori Breath Test             Plan:      No follow-ups on file.     Orders Placed This Encounter   Procedures    XR CHEST STANDARD (2 VW)     Standing Status:   Future     Standing Expiration Date:   7/8/2020    NM MYOCARDIAL SPECT REST EXERCISE OR RX

## 2019-07-18 LAB
ALBUMIN SERPL-MCNC: 4.6 G/DL (ref 3.2–5.3)
ALK PHOSPHATASE: 82 U/L (ref 39–130)
ALT SERPL-CCNC: 20 U/L (ref 0–31)
ANION GAP SERPL CALCULATED.3IONS-SCNC: 10 MMOL/L (ref 4–12)
AST SERPL-CCNC: 26 U/L (ref 0–41)
BILIRUB SERPL-MCNC: 0.6 MG/DL (ref 0.3–1.2)
BUN BLDV-MCNC: 19 MG/DL (ref 5–23)
CALCIUM SERPL-MCNC: 9.7 MG/DL (ref 8.5–10.5)
CHLORIDE BLD-SCNC: 103 MMOL/L (ref 98–109)
CHOLESTEROL/HDL RATIO: 2.7 (ref 1–5)
CHOLESTEROL: 226 MG/DL (ref 150–200)
CO2: 26 MMOL/L (ref 22–32)
CREAT SERPL-MCNC: 0.89 MG/DL (ref 0.4–1)
EGFR AFRICAN AMERICAN: >60 ML/MIN/1.73SQ.M
EGFR IF NONAFRICAN AMERICAN: >60 ML/MIN/1.73SQ.M
GLUCOSE: 92 MG/DL (ref 65–99)
HDLC SERPL-MCNC: 84 MG/DL
LDL CHOLESTEROL CALCULATED: 129 MG/DL
LDL/HDL RATIO: 1.5
POTASSIUM SERPL-SCNC: 4.3 MMOL/L (ref 3.5–5)
SODIUM BLD-SCNC: 139 MMOL/L (ref 134–146)
T4 FREE: 0.69 NG/DL (ref 0.61–1.6)
TOTAL PROTEIN: 7.5 G/DL (ref 6–8)
TRIGL SERPL-MCNC: 63 MG/DL (ref 27–150)
TSH SERPL DL<=0.05 MIU/L-ACNC: 5.45 UIU/ML (ref 0.49–4.67)
VLDLC SERPL CALC-MCNC: 13 MG/DL (ref 0–30)

## 2019-07-19 LAB
ABSOLUTE BASO #: 0.1 K/UL (ref 0–0.1)
ABSOLUTE EOS #: 0.1 K/UL (ref 0.1–0.4)
ABSOLUTE LYMPH #: 1 K/UL (ref 0.8–5.2)
ABSOLUTE MONO #: 0.2 K/UL (ref 0.1–0.9)
ABSOLUTE NEUT #: 3 K/UL (ref 1.3–9.1)
BASOPHILS RELATIVE PERCENT: 1.1 %
EOSINOPHILS RELATIVE PERCENT: 3.2 %
HCT VFR BLD CALC: 42.5 % (ref 36–48)
HEMOGLOBIN: 14.7 G/DL (ref 12–16)
LYMPHOCYTE %: 22.5 %
MCH RBC QN AUTO: 30.8 PG (ref 27–34)
MCHC RBC AUTO-ENTMCNC: 34.6 G/DL (ref 31–36)
MCV RBC AUTO: 88.9 FL (ref 80–100)
MONOCYTES # BLD: 5.3 %
NEUTROPHILS RELATIVE PERCENT: 67.7 %
PDW BLD-RTO: 13.3 % (ref 10.8–14.8)
PLATELETS: 160 K/UL (ref 150–450)
RBC: 4.78 M/UL (ref 4–5.5)
WBC: 4.4 K/UL (ref 3.7–10.8)

## 2019-07-20 LAB — Lab: NEGATIVE

## 2019-07-22 ENCOUNTER — HOSPITAL ENCOUNTER (OUTPATIENT)
Dept: GENERAL RADIOLOGY | Age: 59
Discharge: HOME | End: 2019-07-24
Payer: COMMERCIAL

## 2019-07-22 ENCOUNTER — HOSPITAL ENCOUNTER (OUTPATIENT)
Dept: NUCLEAR MEDICINE | Age: 59
Discharge: HOME | End: 2019-07-24
Payer: COMMERCIAL

## 2019-07-22 ENCOUNTER — HOSPITAL ENCOUNTER (OUTPATIENT)
Dept: NON INVASIVE DIAGNOSTICS | Age: 59
Discharge: HOME | End: 2019-07-24
Payer: COMMERCIAL

## 2019-07-22 ENCOUNTER — HOSPITAL ENCOUNTER (OUTPATIENT)
Age: 59
Discharge: HOME | End: 2019-07-24
Payer: COMMERCIAL

## 2019-07-22 DIAGNOSIS — R07.9 CHEST PAIN, UNSPECIFIED TYPE: ICD-10-CM

## 2019-07-22 DIAGNOSIS — R05.9 COUGH: ICD-10-CM

## 2019-07-22 DIAGNOSIS — Z82.49 FAMILY HISTORY OF CORONARY ARTERY DISEASE: ICD-10-CM

## 2019-07-22 LAB
LV EF: 55 %
LV EF: 70 %
LVEF MODALITY: NORMAL
LVEF MODALITY: NORMAL

## 2019-07-22 PROCEDURE — A9500 TC99M SESTAMIBI: HCPCS | Performed by: PHYSICIAN ASSISTANT

## 2019-07-22 PROCEDURE — 2580000003 HC RX 258: Performed by: PHYSICIAN ASSISTANT

## 2019-07-22 PROCEDURE — 3430000000 HC RX DIAGNOSTIC RADIOPHARMACEUTICAL: Performed by: PHYSICIAN ASSISTANT

## 2019-07-22 PROCEDURE — 93017 CV STRESS TEST TRACING ONLY: CPT

## 2019-07-22 PROCEDURE — 78452 HT MUSCLE IMAGE SPECT MULT: CPT

## 2019-07-22 PROCEDURE — 93306 TTE W/DOPPLER COMPLETE: CPT

## 2019-07-22 PROCEDURE — 71046 X-RAY EXAM CHEST 2 VIEWS: CPT

## 2019-07-22 RX ORDER — NITROGLYCERIN 0.4 MG/1
0.4 TABLET SUBLINGUAL EVERY 5 MIN PRN
Status: ACTIVE | OUTPATIENT
Start: 2019-07-22 | End: 2019-07-22

## 2019-07-22 RX ORDER — SODIUM CHLORIDE 0.9 % (FLUSH) 0.9 %
10 SYRINGE (ML) INJECTION PRN
Status: ACTIVE | OUTPATIENT
Start: 2019-07-22 | End: 2019-07-22

## 2019-07-22 RX ORDER — METOPROLOL TARTRATE 5 MG/5ML
5 INJECTION INTRAVENOUS EVERY 5 MIN PRN
Status: ACTIVE | OUTPATIENT
Start: 2019-07-22 | End: 2019-07-22

## 2019-07-22 RX ORDER — ATROPINE SULFATE 0.1 MG/ML
0.5 INJECTION INTRAVENOUS EVERY 5 MIN PRN
Status: ACTIVE | OUTPATIENT
Start: 2019-07-22 | End: 2019-07-22

## 2019-07-22 RX ORDER — ALBUTEROL SULFATE 90 UG/1
2 AEROSOL, METERED RESPIRATORY (INHALATION) PRN
Status: ACTIVE | OUTPATIENT
Start: 2019-07-22 | End: 2019-07-22

## 2019-07-22 RX ORDER — SODIUM CHLORIDE 9 MG/ML
500 INJECTION, SOLUTION INTRAVENOUS CONTINUOUS PRN
Status: ACTIVE | OUTPATIENT
Start: 2019-07-22 | End: 2019-07-22

## 2019-07-22 RX ORDER — SODIUM CHLORIDE 0.9 % (FLUSH) 0.9 %
10 SYRINGE (ML) INJECTION 2 TIMES DAILY
Status: DISCONTINUED | OUTPATIENT
Start: 2019-07-22 | End: 2019-07-25 | Stop reason: HOSPADM

## 2019-07-22 RX ADMIN — TETRAKIS(2-METHOXYISOBUTYLISOCYANIDE)COPPER(I) TETRAFLUOROBORATE 41.2 MILLICURIE: 1 INJECTION, POWDER, LYOPHILIZED, FOR SOLUTION INTRAVENOUS at 09:19

## 2019-07-22 RX ADMIN — Medication 10 ML: at 08:26

## 2019-07-22 RX ADMIN — TETRAKIS(2-METHOXYISOBUTYLISOCYANIDE)COPPER(I) TETRAFLUOROBORATE 11.4 MILLICURIE: 1 INJECTION, POWDER, LYOPHILIZED, FOR SOLUTION INTRAVENOUS at 08:14

## 2019-07-22 RX ADMIN — Medication 10 ML: at 08:51

## 2019-07-30 ENCOUNTER — TELEPHONE (OUTPATIENT)
Dept: FAMILY MEDICINE CLINIC | Age: 59
End: 2019-07-30

## 2019-08-06 ENCOUNTER — OFFICE VISIT (OUTPATIENT)
Dept: FAMILY MEDICINE CLINIC | Age: 59
End: 2019-08-06
Payer: COMMERCIAL

## 2019-08-06 VITALS
TEMPERATURE: 97.7 F | BODY MASS INDEX: 21.73 KG/M2 | HEIGHT: 70 IN | WEIGHT: 151.8 LBS | OXYGEN SATURATION: 97 % | DIASTOLIC BLOOD PRESSURE: 60 MMHG | SYSTOLIC BLOOD PRESSURE: 91 MMHG

## 2019-08-06 DIAGNOSIS — R07.9 CHEST PAIN, UNSPECIFIED TYPE: Primary | ICD-10-CM

## 2019-08-06 DIAGNOSIS — E03.9 HYPOTHYROIDISM, UNSPECIFIED TYPE: ICD-10-CM

## 2019-08-06 DIAGNOSIS — E78.5 HYPERLIPIDEMIA, UNSPECIFIED HYPERLIPIDEMIA TYPE: ICD-10-CM

## 2019-08-06 PROCEDURE — G8420 CALC BMI NORM PARAMETERS: HCPCS | Performed by: PHYSICIAN ASSISTANT

## 2019-08-06 PROCEDURE — 3017F COLORECTAL CA SCREEN DOC REV: CPT | Performed by: PHYSICIAN ASSISTANT

## 2019-08-06 PROCEDURE — 99214 OFFICE O/P EST MOD 30 MIN: CPT | Performed by: PHYSICIAN ASSISTANT

## 2019-08-06 PROCEDURE — G8427 DOCREV CUR MEDS BY ELIG CLIN: HCPCS | Performed by: PHYSICIAN ASSISTANT

## 2019-08-06 PROCEDURE — 1036F TOBACCO NON-USER: CPT | Performed by: PHYSICIAN ASSISTANT

## 2019-08-06 RX ORDER — CHLORAL HYDRATE 500 MG
CAPSULE ORAL
COMMUNITY

## 2019-08-06 RX ORDER — GLUCOSAMINE HCL 500 MG
TABLET ORAL
COMMUNITY

## 2019-08-06 RX ORDER — LEVOTHYROXINE SODIUM 0.03 MG/1
25 TABLET ORAL DAILY
Qty: 90 TABLET | Refills: 0 | Status: SHIPPED | OUTPATIENT
Start: 2019-08-06 | End: 2019-11-20

## 2019-08-06 ASSESSMENT — ENCOUNTER SYMPTOMS
RHINORRHEA: 0
COUGH: 1
BACK PAIN: 0
SHORTNESS OF BREATH: 0
SPUTUM PRODUCTION: 0
EYE ITCHING: 0
ORTHOPNEA: 0
VOMITING: 0
NAUSEA: 0
ABDOMINAL PAIN: 0
CHEST TIGHTNESS: 0
SINUS PRESSURE: 0
EYE DISCHARGE: 0
SORE THROAT: 0
DIARRHEA: 0

## 2019-08-27 ENCOUNTER — TELEPHONE (OUTPATIENT)
Dept: FAMILY MEDICINE CLINIC | Age: 59
End: 2019-08-27

## 2019-08-27 RX ORDER — LEVOTHYROXINE AND LIOTHYRONINE 9.5; 2.25 UG/1; UG/1
15 TABLET ORAL DAILY
Qty: 30 TABLET | Refills: 3 | Status: SHIPPED | OUTPATIENT
Start: 2019-08-27 | End: 2019-11-20 | Stop reason: SDUPTHER

## 2019-10-03 NOTE — PROGRESS NOTES
Patient:   SALLY PLASCENCIA            MRN: [id number]            FIN: [id number]               Alexus Licea MD  Assistant: Dr. Holley Lovell HOLLEY FELDMAN     Gluconate (COPPER CAPS PO) copper   1 tablet daily      COD LIVER OIL PO Take by mouth      Chromic Chloride POWD Take by mouth      NONFORMULARY Indications: Zinc 25 mg       levothyroxine (SYNTHROID) 25 MCG tablet Take 1 tablet by mouth daily 90 tablet 0     No current facility-administered medications for this visit. Allergies   Allergen Reactions    Terconazole        Health Maintenance   Topic Date Due    Cervical cancer screen  11/20/1983    Shingles Vaccine (1 of 2) 11/20/2012    Breast cancer screen  07/19/2018    Flu vaccine (1) 09/01/2019    DTaP/Tdap/Td vaccine (2 - Td) 03/18/2021    Colon cancer screen colonoscopy  09/16/2023    Lipid screen  07/18/2024    Hepatitis C screen  Completed    HIV screen  Completed    Pneumococcal 0-64 years Vaccine  Aged Out       Subjective:     Review of Systems   Constitutional: Negative for chills, diaphoresis, fatigue, fever and malaise/fatigue. HENT: Negative for congestion, ear discharge, ear pain, postnasal drip, rhinorrhea, sinus pressure and sore throat. Eyes: Negative for discharge and itching. Respiratory: Positive for cough. Negative for sputum production, chest tightness and shortness of breath. Cardiovascular: Positive for chest pain. Negative for palpitations, orthopnea, claudication, leg swelling, syncope, PND and near-syncope. Gastrointestinal: Negative for abdominal pain, diarrhea, nausea and vomiting. Genitourinary: Negative for dysuria and frequency. Musculoskeletal: Negative for back pain, neck pain and neck stiffness. Skin: Negative for rash. Neurological: Negative for dizziness, weakness, light-headedness, numbness and headaches. All other systems reviewed and are negative. Objective:     Physical Exam   Constitutional: She is oriented to person, place, and time. She appears well-developed and well-nourished. No distress.    /63   Pulse 61   Temp 97.6 °F (36.4 °C) (Oral)   Ht 5' 9.5\" (1.765 m) Wt 155 lb (70.3 kg)   SpO2 97%   BMI 22.56 kg/m²      HENT:   Head: Normocephalic and atraumatic. Right Ear: External ear normal.   Left Ear: External ear normal.   Nose: Nose normal.   Mouth/Throat: Oropharynx is clear and moist.   Eyes: Pupils are equal, round, and reactive to light. Conjunctivae and EOM are normal. Right eye exhibits no discharge. Left eye exhibits no discharge. No scleral icterus. Neck: Normal range of motion. Neck supple. No tracheal deviation present. No thyromegaly present. Cardiovascular: Normal rate, regular rhythm and normal heart sounds. Exam reveals no gallop and no friction rub. No murmur heard. Pulmonary/Chest: Effort normal and breath sounds normal. No stridor. No respiratory distress. She has no wheezes. She has no rales. She exhibits no tenderness. Abdominal: Soft. Bowel sounds are normal. She exhibits no distension. There is no tenderness. There is no rebound and no guarding. Musculoskeletal: She exhibits no edema. Neurological: She is alert and oriented to person, place, and time. Gait normal.   Skin: Skin is warm and dry. No rash noted. She is not diaphoretic. Psychiatric: She has a normal mood and affect. Her affect is not inappropriate. Nursing note and vitals reviewed. BP 91/60   Temp 97.7 °F (36.5 °C) (Oral)   Ht 5' 9.5\" (1.765 m)   Wt 151 lb 12.8 oz (68.9 kg)   SpO2 97%   BMI 22.10 kg/m²     Assessment:          Diagnosis Orders   1. Chest pain, unspecified type     2. Hypothyroidism, unspecified type  TSH    T4, Free   3. Hyperlipidemia, unspecified hyperlipidemia type  Lipid Panel             Plan:      Return in about 3 months (around 11/6/2019).     Orders Placed This Encounter   Procedures    TSH     Standing Status:   Future     Standing Expiration Date:   8/6/2020    T4, Free     Standing Status:   Future     Standing Expiration Date:   8/5/2020    Lipid Panel     Standing Status:   Future     Standing Expiration Date:   8/5/2020

## 2019-10-23 ENCOUNTER — TELEPHONE (OUTPATIENT)
Dept: FAMILY MEDICINE CLINIC | Age: 59
End: 2019-10-23

## 2019-10-23 DIAGNOSIS — E03.9 HYPOTHYROIDISM, UNSPECIFIED TYPE: Primary | ICD-10-CM

## 2019-11-13 LAB
T3 FREE: 3.36 PG/ML (ref 2.5–3.9)
T4 FREE: 0.72 NG/DL (ref 0.61–1.6)
TSH SERPL DL<=0.05 MIU/L-ACNC: 2.04 UIU/ML (ref 0.49–4.67)

## 2019-11-20 ENCOUNTER — OFFICE VISIT (OUTPATIENT)
Dept: FAMILY MEDICINE CLINIC | Age: 59
End: 2019-11-20
Payer: COMMERCIAL

## 2019-11-20 VITALS
HEIGHT: 69 IN | TEMPERATURE: 97.9 F | SYSTOLIC BLOOD PRESSURE: 102 MMHG | BODY MASS INDEX: 23.25 KG/M2 | DIASTOLIC BLOOD PRESSURE: 69 MMHG | HEART RATE: 62 BPM | WEIGHT: 157 LBS | OXYGEN SATURATION: 97 %

## 2019-11-20 DIAGNOSIS — E03.9 HYPOTHYROIDISM, UNSPECIFIED TYPE: Primary | ICD-10-CM

## 2019-11-20 PROCEDURE — 3017F COLORECTAL CA SCREEN DOC REV: CPT | Performed by: PHYSICIAN ASSISTANT

## 2019-11-20 PROCEDURE — 99213 OFFICE O/P EST LOW 20 MIN: CPT | Performed by: PHYSICIAN ASSISTANT

## 2019-11-20 PROCEDURE — G8420 CALC BMI NORM PARAMETERS: HCPCS | Performed by: PHYSICIAN ASSISTANT

## 2019-11-20 PROCEDURE — G8427 DOCREV CUR MEDS BY ELIG CLIN: HCPCS | Performed by: PHYSICIAN ASSISTANT

## 2019-11-20 PROCEDURE — 1036F TOBACCO NON-USER: CPT | Performed by: PHYSICIAN ASSISTANT

## 2019-11-20 PROCEDURE — G8484 FLU IMMUNIZE NO ADMIN: HCPCS | Performed by: PHYSICIAN ASSISTANT

## 2019-11-20 RX ORDER — LEVOTHYROXINE AND LIOTHYRONINE 9.5; 2.25 UG/1; UG/1
15 TABLET ORAL DAILY
Qty: 90 TABLET | Refills: 1 | Status: SHIPPED | OUTPATIENT
Start: 2019-11-20

## 2019-11-20 ASSESSMENT — ENCOUNTER SYMPTOMS
COUGH: 1
SHORTNESS OF BREATH: 0
ORTHOPNEA: 0
EYE DISCHARGE: 0
BACK PAIN: 0
DIARRHEA: 0
ABDOMINAL PAIN: 0
RHINORRHEA: 0
EYE ITCHING: 0
NAUSEA: 0
VOMITING: 0
SINUS PRESSURE: 0
SPUTUM PRODUCTION: 0
SORE THROAT: 0
CHEST TIGHTNESS: 0

## 2022-08-30 ENCOUNTER — OFFICE VISIT (OUTPATIENT)
Dept: DERMATOLOGY | Age: 62
End: 2022-08-30
Payer: COMMERCIAL

## 2022-08-30 VITALS
TEMPERATURE: 98.6 F | HEIGHT: 69 IN | BODY MASS INDEX: 23.37 KG/M2 | WEIGHT: 157.8 LBS | DIASTOLIC BLOOD PRESSURE: 71 MMHG | HEART RATE: 62 BPM | OXYGEN SATURATION: 99 % | SYSTOLIC BLOOD PRESSURE: 108 MMHG

## 2022-08-30 DIAGNOSIS — L82.1 SEBORRHEIC KERATOSIS: ICD-10-CM

## 2022-08-30 DIAGNOSIS — L81.4 LENTIGO: ICD-10-CM

## 2022-08-30 DIAGNOSIS — L73.8 SEBACEOUS HYPERPLASIA: ICD-10-CM

## 2022-08-30 DIAGNOSIS — L71.9 ROSACEA: Primary | ICD-10-CM

## 2022-08-30 DIAGNOSIS — Z80.8 FAMILY HISTORY OF NONMELANOMA SKIN CANCER: ICD-10-CM

## 2022-08-30 DIAGNOSIS — D22.9 MULTIPLE NEVI: ICD-10-CM

## 2022-08-30 DIAGNOSIS — D23.9 DERMATOFIBROMA: ICD-10-CM

## 2022-08-30 PROCEDURE — 4004F PT TOBACCO SCREEN RCVD TLK: CPT | Performed by: DERMATOLOGY

## 2022-08-30 PROCEDURE — 99204 OFFICE O/P NEW MOD 45 MIN: CPT | Performed by: DERMATOLOGY

## 2022-08-30 PROCEDURE — G8427 DOCREV CUR MEDS BY ELIG CLIN: HCPCS | Performed by: DERMATOLOGY

## 2022-08-30 PROCEDURE — G8420 CALC BMI NORM PARAMETERS: HCPCS | Performed by: DERMATOLOGY

## 2022-08-30 PROCEDURE — 3017F COLORECTAL CA SCREEN DOC REV: CPT | Performed by: DERMATOLOGY

## 2022-08-30 RX ORDER — CHLORHEXIDINE GLUCONATE 0.12 MG/ML
RINSE ORAL
COMMUNITY
Start: 2022-08-10

## 2022-08-30 RX ORDER — LIOTHYRONINE SODIUM 5 UG/1
TABLET ORAL
COMMUNITY
Start: 2022-08-24

## 2022-08-30 RX ORDER — LEVOTHYROXINE SODIUM 50 MCG
TABLET ORAL
COMMUNITY
Start: 2022-08-24

## 2022-08-30 NOTE — PROGRESS NOTES
Dermatology Patient Note  [address]  [place] [id number]  Dept: [phone]  Dept Fax: [phone]      VISITDATE: 8/30/2022   REFERRING PROVIDER: No ref. provider found      Nolan Olivarez is a 63 y.o. female  who presents today in the office for:    New Patient (FBSE pt has a hx of skin cancer in the family. Pt states she has been having acne on her face lately going on for about a few years since she started the thyroid meds. Pt was dx with Rosacea  about 10 yrs ago )      HISTORY OF PRESENT ILLNESS:  As above. She states when she wakes up she has a lot of pus on her face. Denies personal hx of skin cancer, however she states her mother and father had a nonmelanoma skin cancer after age 80. Her mother had a BCC. She states when she was younger she had a lot of sun exposure and did not use sun protection. Patient also states she has dry eyes. MEDICAL PROBLEMS:  There are no problems to display for this patient.       CURRENT MEDICATIONS:   Current Outpatient Medications   Medication Sig Dispense Refill    thyroid (ARMOUR THYROID) 15 MG tablet Take 1 tablet by mouth daily 90 tablet 1    B COMPLEX VITAMINS PO Vitamins B Complex      Cholecalciferol (VITAMIN D3) 3000 units TABS Vitamin D3      MAGNESIUM CITRATE PO Take by mouth      COD LIVER OIL PO Take by mouth      NONFORMULARY Indications: Zinc 25 mg       SYNTHROID 50 MCG tablet TAKE 1 TABLET BY MOUTH IN THE MORNING ON AN EMPTY STOMACH      chlorhexidine (PERIDEX) 0.12 % solution RINSE WITH 1/2 OUNCE BY MOUTH TWICE DAILY FOR 30 SECONDS.      liothyronine (CYTOMEL) 5 MCG tablet take 1 tablet by mouth once daily ON AN EMPTY STOMACH      Multiple Vitamins-Minerals (MULTIVITAMIN ADULT PO) multivitamin (Patient not taking: Reported on 8/30/2022)      Omega-3 Fatty Acids (FISH OIL) 1000 MG CAPS Fish Oil (Patient not taking: Reported on 8/30/2022)      Copper Gluconate (COPPER CAPS PO) copper   1 tablet daily (Patient not taking: Reported on 8/30/2022)      Chromic Chloride POWD Take by mouth (Patient not taking: Reported on 8/30/2022)       No current facility-administered medications for this visit. ALLERGIES:   Allergies   Allergen Reactions    Terconazole        SOCIAL HISTORY:  Social History     Tobacco Use    Smoking status: Never    Smokeless tobacco: Never   Substance Use Topics    Alcohol use: Yes       Pertinent ROS:  Review of Systems  Skin: Denies any new changing, growing or bleeding lesions or rashes except as described in the HPI   Constitutional: Denies fevers, chills, and malaise. PHYSICAL EXAM:   /71   Pulse 62   Temp 98.6 °F (37 °C) (Temporal)   Ht 5' 9\" (1.753 m)   Wt 157 lb 12.8 oz (71.6 kg)   SpO2 99%   BMI 23.30 kg/m²     The patient is generally well appearing, well nourished, alert and conversational. Affect is normal.    Cutaneous Exam:  Physical Exam  Total body skin exam excluding external genitalia: head/face, neck, both arms, chest, back, abdomen, both legs, buttocks, digits and/or nails, was examined. Genital exam was deferred as patient denied having any lesions in this area. Complete visualization of scalp may be limited by hair density, length, and/or style    Facial covering was removed during examination. Diagnoses/exam findings/medical history pertinent to this visit are listed below:    Assessment:   Diagnosis Orders   1. Rosacea        2. Seborrheic keratosis        3. Family history of nonmelanoma skin cancer        4. Dermatofibroma        5. Multiple nevi        6. Lentigo        7. Sebaceous hyperplasia             Plan:  Rosacea, papulopustular  - chronic illness with progression and/or exacerbation   - discussed diagnosis, etiology, natural course, and treatment options.  Patient would like to try the most natural treatment option first.   - start azelaic acid 15% cream BID to affected area   - encouraged patient to see ophthalmologist, she may also have occular rosacea    Seborrheic keratoses of ext, chest, face  - reassurance and education     Multiple nevi  - Clinically and dermatoscopically benign on exam today. - Common nevi have a low individual risk of developing into melanoma. Patients with >50 nevi have a greater risk of developing melanoma in their lifetime and should undergo skin checks at least annually. - I recommended the patient apply broad spectrum spf 30+ sunscreen daily, reapplying every 2 hours  - In additional to regular use of sunscreen, I recommended broad-rimmed hats, long sleeves, and seeking the shade. Family history of nonmelanoma skin cancer  - annual FBSE    Dermatofibroma(s) of abdomen  - reassurance and education     Solar lentigo of back  - patient was counseled that UV-damaged skin increases lifetime risk for skin cancer  - I recommended the patient apply broad spectrum spf 30+ sunscreen daily, reapplying every 2 hours. - In additional to regular use of sunscreen, I recommended broad-rimmed hats, long sleeves, and seeking the shade. Sebcaeous hyperplasia  - reassurance and education     RTC 1 year    No future appointments. Patient Instructions   Start azelaic acid cream to the face twice daily.        Tips for rosacea sufferers:    What to look for in sunscreen:  Zinc oxide, titanium dioxide, or both  Silicone (may be listed as dimethicone, orcyclomethicone, or cyclomethicone)  No fragrance (label may say fragrance free, but if it says unscented choose another sunscreen)  Broad-spectrum protection  SPF 30 or higher    What to look for in moisturizers & sunscreen:  Hyaluronic acid  Niacinamide  Licorice (glycyrrhiza glabra, Licochalcone A)   Ceramides  Silicone (may be listed as dimethicone, orcyclomethicone, or cyclomethicone)    What to avoid in products  Fragrance  Menthol  Eucalyptus   Essential oils  Camphor  Alcohol   Peppermint  Witch Veronika      Some fragrance names  Balsam of peru (myroxylon pereirae)  Cinnamic aldehyde  Cinnamyl alcohol  Hydroxycitronellal  Isoeugenol  Eugenol  Oak bro absolute  Oil of Bergamot  alpha-amyl cinnamic aldehyde  geraniol  Linalool  Lavender  Perfumes  Toilet water  Colognes  Masking perfumes  Unscented perfumes  Aroma chemicals  Essential oils  Estephanie oil  Cinnamon  Cloves  Citronella   Ethylene bassylate  Sweet Orange  Sweet Basil  Citrus   Lemon peel  Limonene     ROSACEA    What is rosacea? Rosacea is a common skin condition, usually occurring on the face, which predominantly affects fair-skinned but may affect all skin types in people aged 36to 61years old. It is more common in women but when affecting men, it may be more severe. It is a chronic condition, and can persist for a long time and, in any individual, the severity tends to fluctuate. Rosacea tends to affect the cheeks, forehead, chin and nose, and is characterised by persistent redness caused by dilated blood vessels, small bumps and pus-filled spots similar to acne There may also be uncomfortable inflammation of the surface of the eyes and eyelids. Rosacea is classified into 4 subtypes that may overlap. Your doctor will advise you of the type you have. What causes rosacea? The cause of rosacea is not fully understood. Your genetics, immune system factors, and environmental factors may all play a part. Factors that trigger rosacea cause the blood vessels in the skin of the face to enlarge (dilate). The theory that rosacea is due to bacteria on the skin or in the gut has not been proven. However, antibiotics have proven helpful to treat rosacea. This is because of their anti-inflammatory effect. Rosacea is not contagious. There are a variety of triggers that may make rosacea worse. These include alcohol, exercise, high and low temperatures, hot drinks, spicy foods and stress. Rosacea can be sun sensitive.     Is rosacea hereditary? Rosacea does seem to run in some families, but there is no clear genetic link. What are the symptoms of rosacea? The rash and the blushing associated with rosacea can lead to embarrassment, lowered self-esteem and self-confidence, anxiety and even depression. Furthermore, the skin of the face is often sensitive, and the affected area can feel very hot or sting. Some people with rosacea have eye symptoms. A few patients with rosacea may develop more serious eye problems, such as painful inflammation involving the front part of the eye (rosacea keratitis) and this may cause blurred vision. It is important that you consult a dermatologist or an    if you develop symptoms affecting the eyes. What does rosacea look like? Rosacea usually starts with a tendency to blush easily. After a while, the central areas of the face become a permanent deeper shade of red, with small dilated blood vessels, bumps and pus-filled spots. Occasionally, there may be some swelling of the facial skin (lymphoedema), especially around the eyes. Occasionally, an overgrowth of the oil-secreting glands on the nose may cause the nose to become enlarged, bulbous and red (called rhinophyma). Rhinophyma is more common in men than women. How will rosacea be diagnosed? Rosacea can be diagnosed by its appearance. Specific tests are not usually required. Can rosacea be cured? No, but long-term treatments can be helpful. How can rosacea be treated? The inflammation that accompanies rosacea can be treated with preparations applied to the skin or taken by mouth; however, not all these will help the redness or blushing that may be associated with rosacea. Local applications:     The inflammatory element of rosacea may be controlled with a medication applied to the affected areas It takes at least 8 weeks for their effect to become evident and some applications work specifically to reduce the redness associated with rosacea     Oral antibiotics: These are helpful for the inflammatory element of moderate or severe rosacea. The most commonly used antibiotics belong to the tetracycline group and include tetracycline, oxytetracycline, doxycycline, lymecycline and minocycline. Erythromycin is another commonly used antibiotic. The duration of an antibiotic course depends on your response. Your doctor may suggest that you use a cream and oral treatment together. Other treatments: An eye specialist should manage the severe types of eye involvement. A bulbous nose affected by rhinophyma can be reduced by a dermatologist or a plastic surgeon  Redness and dilated blood vessels can be treated with laser therapy. by a dermatologist.  A beta-blocker tablet or clonidine may be prescribed by the dermatologist it may help if blushing is a significant problem. Isotretinoin tablets are sometimes prescribed by a dermatologist for severe rosacea. Self Care (What can I do?)    --Protect your skin from the sun by using a sun block (with a sun protection factor of at least 30) on your face every day and needs re-applying frequently if outdoors. --Do not rub or scrub your face when cleansing as this can make rosacea worse. --Do not use perfumed soap as this can make rosacea worse. --Use a soap substitute (emollient) to cleanse your face. --Use an unperfumed moisturiser on a regular basis if your skin is dry or sensitive. --Consider the lifestyle factors that can worsen rosacea   and avoid them; a written record of your flare-ups may help. --Some Cosmetics can often cover up rosacea effectively, and some rosacea patients may benefit from the use of skin camouflage. This may help hide excessive redness. --Unless they are specifically recommended to you by your dermatologist it may be best to avoid some treatments for acne, as they can irritate skin that is prone to rosacea.   --Do not use topical preparations containing corticosteroids, unless specifically recommended by your dermatologist as these may make rosacea worse in the long run. --If your eyes are affected, do not ignore them - consult your dermatologist or an eye specialist doctor. --Some drugs can aggravate blushing, and your doctor or dermatologist may make appropriate changes to your medication. Source: [facility]     [facility], or nevi, are very common. Moles are areas of the skin where there are more cells called melanocytes. Melanocytes are the cells in the body that produce pigment, or color. Moles can be many colors including skin-tone, pink, tan, brown, and very dark brown to black. Moles can be raised or flat. Moles can have hair. Moles can grow on any skin surface, including the scalp, hands and feet. When someone is born with a mole, or develops one in the first months of life, the mole is called a congenital, or birthmark mole. About 1 in 100 people are born with one or more moles. Most people develop their moles later in childhood or adulthood. These are called acquired moles. They are most common on sun exposed areas of skin such as the face, neck, upper body, arms and legs. CHECKING MOLES  Most moles are harmless, but in rare cases moles may become cancerous. Checking moles and looking for changes is an important step in helping to catch worrisome changes early. Some changes to look for are asymmetry (moles that do not look the same on each half), irregular shapes or borders, uneven color or large size. Also look for any moles that bleed, itch, or become painful. Looking at your skin regularly can help you recognize moles that are more at risk for becoming cancerous. WHEN TO CALL THE DOCTOR  Call your doctor if you see any of the following changes in a mole:       Irregular borders (uneven shape or edges)       Changes in color to black, blue or red.        Changes in the surface texture       Scabs, scaling, irritation or bleeding in the mole    TREATMENT FOR MOLES  Often we can simply look at your moles and tell you if they look worrisome. If we are not concerned about the look of your moles at your appointment, we may measure some moles and take some photos that will allow us to watch for future changes in the moles. TREATMENT FOR MOLES  If a mole is getting irritated frequently, bleeding, difficult to watch due to location or dark color, atypical in appearance, or worrisome, we may perform a skin biopsy. A skin biopsy is a procedure that involves removing the mole so that it can be looked at under a microscope. There are many methods used to remove moles. The method we choose depends on the location of the mole, the size of the mole, and the amount of concern for skin cancer. Generally, removing moles in the dermatologists office is a simple and safe procedure that can be done with local anesthesia. PREVENTION  You can do some things to prevent moles from becoming cancerous:       Try to avoid long periods of time in the sun and severe sunburns. The sun is        especially dangerous between 10:00 am and 4:00 pm.       Use a broad spectrum, water-resistant sun block lotion with an SPF of 30 or        greater. A broad spectrum lotion blocks both UVA and UVB rays from the sun. Re-apply sunscreen at least every 2 hours and after swimming or sweating. Take advantage of shade whenever possible. Wear a broad-brimmed hat,        sunglasses, and protective clothing when outdoors. Do not use tanning beds. Sun Protection     There are two types of sun rays that are harmful to the skin. UVA rays cause skin aging and skin cancer, such as melanoma. UVB rays cause sunburns, cataracts, and also contribute to skin cancer. The [employer] recommends that children and adults wear a broad spectrum, waterproof sunscreen with a Sun Protection Factor (SPF) of 30 or higher. It is important to check the ingredient label to be sure the sunscreen will protect the skin from both UVA and UVB sunrays. Your sunscreen should contain at least one of the following ingredients: titanium dioxide, zinc oxide, or avobenzone. Sunscreen will not be effective unless it is applied to all exposed skin. Sunscreens work best if they are applied 30 minutes before sun exposure. They should be reapplied every 2 hours and after any water exposure. Sunscreen is not perfect. It is important to use other methods to protect the skin from sun exposure also. Wear hats, sunglasses and other sun protective clothing when outdoors. Stay in the shade during the peak hours of sun exposure between 10 AM and 4 PM.     I, Georgia Boyd, personally scribed the services dictated to me by Dr. Jacobo Puckett in this documentation. I, Dr. Jacobo Puckett, personally performed the services described in this documentation, as scribed by Georgia Boyd in my presence, and it is both accurate and complete.     Electronically signed by Alexis Garduno MD on 8/30/2022 at 5:02 PM

## 2022-08-30 NOTE — PATIENT INSTRUCTIONS
Start azelaic acid cream to the face twice daily. Tips for rosacea sufferers:    What to look for in sunscreen:  Zinc oxide, titanium dioxide, or both  Silicone (may be listed as dimethicone, orcyclomethicone, or cyclomethicone)  No fragrance (label may say fragrance free, but if it says unscented choose another sunscreen)  Broad-spectrum protection  SPF 30 or higher    What to look for in moisturizers & sunscreen:  Hyaluronic acid  Niacinamide  Licorice (glycyrrhiza glabra, Licochalcone A)   Ceramides  Silicone (may be listed as dimethicone, orcyclomethicone, or cyclomethicone)    What to avoid in products  Fragrance  Menthol  Eucalyptus   Essential oils  Camphor  Alcohol   Peppermint  Witch Hazel      Some fragrance names  Balsam of peru (myroxylon pereirae)  Cinnamic aldehyde  Cinnamyl alcohol  Hydroxycitronellal  Isoeugenol  Eugenol  Oak bro absolute  Oil of Bergamot  alpha-amyl cinnamic aldehyde  geraniol  Linalool  Lavender  Perfumes  Toilet water  Colognes  Masking perfumes  Unscented perfumes  Aroma chemicals  Essential oils  Estephanie oil  Cinnamon  Cloves  Citronella   Ethylene bassylate  Sweet Orange  Sweet Basil  Citrus   Lemon peel  Limonene     ROSACEA    What is rosacea? Rosacea is a common skin condition, usually occurring on the face, which predominantly affects fair-skinned but may affect all skin types in people aged 36to 61years old. It is more common in women but when affecting men, it may be more severe. It is a chronic condition, and can persist for a long time and, in any individual, the severity tends to fluctuate. Rosacea tends to affect the cheeks, forehead, chin and nose, and is characterised by persistent redness caused by dilated blood vessels, small bumps and pus-filled spots similar to acne There may also be uncomfortable inflammation of the surface of the eyes and eyelids. Rosacea is classified into 4 subtypes that may overlap.  Your doctor will advise you of the type you have. What causes rosacea? The cause of rosacea is not fully understood. Your genetics, immune system factors, and environmental factors may all play a part. Factors that trigger rosacea cause the blood vessels in the skin of the face to enlarge (dilate). The theory that rosacea is due to bacteria on the skin or in the gut has not been proven. However, antibiotics have proven helpful to treat rosacea. This is because of their anti-inflammatory effect. Rosacea is not contagious. There are a variety of triggers that may make rosacea worse. These include alcohol, exercise, high and low temperatures, hot drinks, spicy foods and stress. Rosacea can be sun sensitive. Is rosacea hereditary? Rosacea does seem to run in some families, but there is no clear genetic link. What are the symptoms of rosacea? The rash and the blushing associated with rosacea can lead to embarrassment, lowered self-esteem and self-confidence, anxiety and even depression. Furthermore, the skin of the face is often sensitive, and the affected area can feel very hot or sting. Some people with rosacea have eye symptoms. A few patients with rosacea may develop more serious eye problems, such as painful inflammation involving the front part of the eye (rosacea keratitis) and this may cause blurred vision. It is important that you consult a dermatologist or an    if you develop symptoms affecting the eyes. What does rosacea look like? Rosacea usually starts with a tendency to blush easily. After a while, the central areas of the face become a permanent deeper shade of red, with small dilated blood vessels, bumps and pus-filled spots. Occasionally, there may be some swelling of the facial skin (lymphoedema), especially around the eyes. Occasionally, an overgrowth of the oil-secreting glands on the nose may cause the nose to become enlarged, bulbous and red (called rhinophyma).  Rhinophyma is more common in men than women. How will rosacea be diagnosed? Rosacea can be diagnosed by its appearance. Specific tests are not usually required. Can rosacea be cured? No, but long-term treatments can be helpful. How can rosacea be treated? The inflammation that accompanies rosacea can be treated with preparations applied to the skin or taken by mouth; however, not all these will help the redness or blushing that may be associated with rosacea. Local applications: The inflammatory element of rosacea may be controlled with a medication applied to the affected areas It takes at least 8 weeks for their effect to become evident and some applications work specifically to reduce the redness associated with rosacea     Oral antibiotics: These are helpful for the inflammatory element of moderate or severe rosacea. The most commonly used antibiotics belong to the tetracycline group and include tetracycline, oxytetracycline, doxycycline, lymecycline and minocycline. Erythromycin is another commonly used antibiotic. The duration of an antibiotic course depends on your response. Your doctor may suggest that you use a cream and oral treatment together. Other treatments: An eye specialist should manage the severe types of eye involvement. A bulbous nose affected by rhinophyma can be reduced by a dermatologist or a plastic surgeon  Redness and dilated blood vessels can be treated with laser therapy. by a dermatologist.  A beta-blocker tablet or clonidine may be prescribed by the dermatologist it may help if blushing is a significant problem. Isotretinoin tablets are sometimes prescribed by a dermatologist for severe rosacea. Self Care (What can I do?)    --Protect your skin from the sun by using a sun block (with a sun protection factor of at least 30) on your face every day and needs re-applying frequently if outdoors. --Do not rub or scrub your face when cleansing as this can make rosacea worse.   --Do not use perfumed soap as this can make rosacea worse. --Use a soap substitute (emollient) to cleanse your face. --Use an unperfumed moisturiser on a regular basis if your skin is dry or sensitive. --Consider the lifestyle factors that can worsen rosacea   and avoid them; a written record of your flare-ups may help. --Some Cosmetics can often cover up rosacea effectively, and some rosacea patients may benefit from the use of skin camouflage. This may help hide excessive redness. --Unless they are specifically recommended to you by your dermatologist it may be best to avoid some treatments for acne, as they can irritate skin that is prone to rosacea. --Do not use topical preparations containing corticosteroids, unless specifically recommended by your dermatologist as these may make rosacea worse in the long run. --If your eyes are affected, do not ignore them - consult your dermatologist or an eye specialist doctor. --Some drugs can aggravate blushing, and your doctor or dermatologist may make appropriate changes to your medication. Source: [facility]     [facility], or nevi, are very common. Moles are areas of the skin where there are more cells called melanocytes. Melanocytes are the cells in the body that produce pigment, or color. Moles can be many colors including skin-tone, pink, tan, brown, and very dark brown to black. Moles can be raised or flat. Moles can have hair. Moles can grow on any skin surface, including the scalp, hands and feet. When someone is born with a mole, or develops one in the first months of life, the mole is called a congenital, or birthmark mole. About 1 in 100 people are born with one or more moles. Most people develop their moles later in childhood or adulthood. These are called acquired moles. They are most common on sun exposed areas of skin such as the face, neck, upper body, arms and legs.     CHECKING MOLES  Most moles are harmless, but in rare cases moles may become cancerous. Checking moles and looking for changes is an important step in helping to catch worrisome changes early. Some changes to look for are asymmetry (moles that do not look the same on each half), irregular shapes or borders, uneven color or large size. Also look for any moles that bleed, itch, or become painful. Looking at your skin regularly can help you recognize moles that are more at risk for becoming cancerous. WHEN TO CALL THE DOCTOR  Call your doctor if you see any of the following changes in a mole:       Irregular borders (uneven shape or edges)       Changes in color to black, blue or red. Changes in the surface texture       Scabs, scaling, irritation or bleeding in the mole    TREATMENT FOR MOLES  Often we can simply look at your moles and tell you if they look worrisome. If we are not concerned about the look of your moles at your appointment, we may measure some moles and take some photos that will allow us to watch for future changes in the moles. TREATMENT FOR MOLES  If a mole is getting irritated frequently, bleeding, difficult to watch due to location or dark color, atypical in appearance, or worrisome, we may perform a skin biopsy. A skin biopsy is a procedure that involves removing the mole so that it can be looked at under a microscope. There are many methods used to remove moles. The method we choose depends on the location of the mole, the size of the mole, and the amount of concern for skin cancer. Generally, removing moles in the dermatologists office is a simple and safe procedure that can be done with local anesthesia. PREVENTION  You can do some things to prevent moles from becoming cancerous:       Try to avoid long periods of time in the sun and severe sunburns. The sun is        especially dangerous between 10:00 am and 4:00 pm.       Use a broad spectrum, water-resistant sun block lotion with an SPF of 30 or        greater.  A broad spectrum lotion blocks both UVA and UVB rays from the sun. Re-apply sunscreen at least every 2 hours and after swimming or sweating. Take advantage of shade whenever possible. Wear a broad-brimmed hat,        sunglasses, and protective clothing when outdoors. Do not use tanning beds. Sun Protection     There are two types of sun rays that are harmful to the skin. UVA rays cause skin aging and skin cancer, such as melanoma. UVB rays cause sunburns, cataracts, and also contribute to skin cancer. The [employer] Derm[employer] recommends that children and adults wear a broad spectrum, waterproof sunscreen with a Sun Protection Factor (SPF) of 30 or higher. It is important to check the ingredient label to be sure the sunscreen will protect the skin from both UVA and UVB sunrays. Your sunscreen should contain at least one of the following ingredients: titanium dioxide, zinc oxide, or avobenzone. Sunscreen will not be effective unless it is applied to all exposed skin. Sunscreens work best if they are applied 30 minutes before sun exposure. They should be reapplied every 2 hours and after any water exposure. Sunscreen is not perfect. It is important to use other methods to protect the skin from sun exposure also. Wear hats, sunglasses and other sun protective clothing when outdoors.   Stay in the shade during the peak hours of sun exposure between 10 AM and 4 PM.

## 2022-09-06 ENCOUNTER — TELEPHONE (OUTPATIENT)
Dept: DERMATOLOGY | Age: 62
End: 2022-09-06

## 2022-09-06 DIAGNOSIS — L71.9 ROSACEA: Primary | ICD-10-CM

## 2022-09-06 NOTE — TELEPHONE ENCOUNTER
Pt was last seen on 8/30/ 22 an was told to consult with an eye dr about her dry eyes possibly due to Rosacea. The eye Dr needs a referral to see her for this condition.  Dr. Dacosta Kin

## 2022-12-14 NOTE — PROGRESS NOTES
Dermatology Patient Note  [address] [id number]  Dept: [phone]  Dept Fax: [phone]      VISITDATE: 12/19/2022   REFERRING PROVIDER: No ref. provider found      Rina Barksdale is a 63 y.o. female  who presents today in the office for:    Follow-up (Dx:Rosacea, pt states Tx going well, Pt has insurance issues that we may need to submit PA for medication)      HISTORY OF PRESENT ILLNESS:  Patient presents for rosacea follow up (LV 8/30/2022). Patient reports she was able to get azelaic acid but stated her refills were not covered. Patient states she saw the opthalmologist and reported she might have occular rosacea and she was given medication which helps. However she is not sure if her symptoms are due to her thyroid or rosacea. She reports that if she avoids her dietary triggers then it is controlled. Patient has family history of skin cancer and was also treated for papulopustular rosacea. She was given azelaic acid and she was recommended to see ophthalmologist for potential occular rosacea. MEDICAL PROBLEMS:  There are no problems to display for this patient.       CURRENT MEDICATIONS:   Current Outpatient Medications   Medication Sig Dispense Refill    azelaic acid (AZELEX) 20 % cream Apply to face twice daily 30 g 11    SYNTHROID 50 MCG tablet TAKE 1 TABLET BY MOUTH IN THE MORNING ON AN EMPTY STOMACH      liothyronine (CYTOMEL) 5 MCG tablet take 1 tablet by mouth once daily ON AN EMPTY STOMACH      B COMPLEX VITAMINS PO Vitamins B Complex      Cholecalciferol (VITAMIN D3) 3000 units TABS Vitamin D3      MAGNESIUM CITRATE PO Take by mouth      Omega-3 Fatty Acids (FISH OIL) 1000 MG CAPS       methylPREDNISolone (MEDROL) 4 MG tablet methylprednisolone 4 mg tablets in a dose pack   TAKE ONE ROW OF TABLETS EACH DAY AS INSTRUCTED ON THE PACKAGE; take after a meal (Patient not taking: Reported on 12/19/2022)      cephALEXin (KEFLEX) 500 MG capsule cephalexin 500 mg capsule   TAKE 1 CAPSULE BY MOUTH FOUR TIMES A DAY FOR 10 DAYS (Patient not taking: Reported on 12/19/2022)      Cyanocobalamin 3000 MCG/ML LIQD Place 1 drop under the tongue daily (Patient not taking: Reported on 12/19/2022)      nitrofurantoin, macrocrystal-monohydrate, (MACROBID) 100 MG capsule nitrofurantoin monohydrate/macrocrystals 100 mg capsule   TAKE 1 CAPSULE BY MOUTH TWO TIMES A DAY FOR 5 DAYS (Patient not taking: Reported on 12/19/2022)      trimethoprim-polymyxin b (POLYTRIM) 56302-0.1 UNIT/ML-% ophthalmic solution polymyxin B sulfate 10,000 unit-trimethoprim 1 mg/mL eye drops   INSTILL 1 DROP INTO EACH EYE SIX TIMES A DAY FOR 5 DAYS (Patient not taking: Reported on 12/19/2022)      predniSONE (DELTASONE) 20 MG tablet prednisone 20 mg tablet   TAKE 2 TABLETS BY MOUTH EVERY DAY FOR 5 DAYS (Patient not taking: Reported on 12/19/2022)      chlorhexidine (PERIDEX) 0.12 % solution RINSE WITH 1/2 OUNCE BY MOUTH TWICE DAILY FOR 30 SECONDS. (Patient not taking: Reported on 12/19/2022)      thyroid (ARMOUR THYROID) 15 MG tablet Take 1 tablet by mouth daily (Patient not taking: Reported on 12/19/2022) 90 tablet 1    Multiple Vitamins-Minerals (MULTIVITAMIN ADULT PO) multivitamin (Patient not taking: Reported on 12/19/2022)      Copper Gluconate (COPPER CAPS PO) copper   1 tablet daily (Patient not taking: No sig reported)      COD LIVER OIL PO Take by mouth (Patient not taking: Reported on 12/19/2022)      Chromic Chloride POWD Take by mouth (Patient not taking: No sig reported)      NONFORMULARY Indications: Zinc 25 mg  (Patient not taking: Reported on 12/19/2022)       No current facility-administered medications for this visit.        ALLERGIES:   Allergies   Allergen Reactions    Terconazole        SOCIAL HISTORY:  Social History     Tobacco Use    Smoking status: Never    Smokeless tobacco: Never   Substance Use Topics Alcohol use: Yes       Pertinent ROS:  Review of Systems  Skin: Denies any new changing, growing or bleeding lesions or rashes except as described in the HPI   Constitutional: Denies fevers, chills, and malaise. PHYSICAL EXAM:   BP (!) 106/50   Pulse 60   Temp 97.9 °F (36.6 °C) (Temporal)   Wt 161 lb 9.6 oz (73.3 kg)   SpO2 98%   BMI 23.86 kg/m²     The patient is generally well appearing, well nourished, alert and conversational. Affect is normal.    Cutaneous Exam:  Physical Exam  Face and neck only was examined. Facial covering was removed during examination. Diagnoses/exam findings/medical history pertinent to this visit are listed below:    Assessment:   Diagnosis Orders   1. Rosacea  azelaic acid (AZELEX) 20 % cream           Plan:  Rosacea, papulopustular  - chronic illness, responding to treatment but not yet at goal   - continue azelaic acid (requires PA)  - discussed advancing treatment with metrocream/ivermectin, patient not interested at this time  - discussed with patient if it is not covered then she may have to purchase it from a compounding pharmacy or specialty pharmacy  - patient wants to use most natural treatment    RTC 1 year    No future appointments.         Patient Instructions   Tips for rosacea sufferers:    What to look for in sunscreen:  Zinc oxide, titanium dioxide, or both  Silicone (may be listed as dimethicone, orcyclomethicone, or cyclomethicone)  No fragrance (label may say fragrance free, but if it says unscented choose another sunscreen)  Broad-spectrum protection  SPF 30 or higher    What to look for in moisturizers & sunscreen:  Hyaluronic acid  Niacinamide  Licorice (glycyrrhiza glabra, Licochalcone A)   Ceramides  Silicone (may be listed as dimethicone, orcyclomethicone, or cyclomethicone)    What to avoid in products  Fragrance  Menthol  Eucalyptus   Essential oils  Camphor  Alcohol   Peppermint  Witch Hazel      Some fragrance names  Fei Marrero (myroxylon pereirae)  Cinnamic aldehyde  Cinnamyl alcohol  Hydroxycitronellal  Isoeugenol  Eugenol  Oak bro absolute  Oil of Bergamot  alpha-amyl cinnamic aldehyde  geraniol  Linalool  Lavender  Perfumes  Toilet water  Colognes  Masking perfumes  Unscented perfumes  Aroma chemicals  Essential oils  [place]ha oil  Cinnamon  Cloves  Citronella   Ethylene bassylate  Sweet Orange  Sweet Basil  Citrus   Lemon peel  Limonene       ROSACEA    What is rosacea? Rosacea is a common skin condition, usually occurring on the face, which predominantly affects fair-skinned but may affect all skin types in people aged 36to 61years old. It is more common in women but when affecting men, it may be more severe. It is a chronic condition, and can persist for a long time and, in any individual, the severity tends to fluctuate. Rosacea tends to affect the cheeks, forehead, chin and nose, and is characterised by persistent redness caused by dilated blood vessels, small bumps and pus-filled spots similar to acne There may also be uncomfortable inflammation of the surface of the eyes and eyelids. Rosacea is classified into 4 subtypes that may overlap. Your doctor will advise you of the type you have. What causes rosacea? The cause of rosacea is not fully understood. Your genetics, immune system factors, and environmental factors may all play a part. Factors that trigger rosacea cause the blood vessels in the skin of the face to enlarge (dilate). The theory that rosacea is due to bacteria on the skin or in the gut has not been proven. However, antibiotics have proven helpful to treat rosacea. This is because of their anti-inflammatory effect. Rosacea is not contagious. There are a variety of triggers that may make rosacea worse. These include alcohol, exercise, high and low temperatures, hot drinks, spicy foods and stress. Rosacea can be sun sensitive. Is rosacea hereditary?     Rosacea does seem to run in some families, but there is no clear genetic link. What are the symptoms of rosacea? The rash and the blushing associated with rosacea can lead to embarrassment, lowered self-esteem and self-confidence, anxiety and even depression. Furthermore, the skin of the face is often sensitive, and the affected area can feel very hot or sting. Some people with rosacea have eye symptoms. A few patients with rosacea may develop more serious eye problems, such as painful inflammation involving the front part of the eye (rosacea keratitis) and this may cause blurred vision. It is important that you consult a dermatologist or an    if you develop symptoms affecting the eyes. What does rosacea look like? Rosacea usually starts with a tendency to blush easily. After a while, the central areas of the face become a permanent deeper shade of red, with small dilated blood vessels, bumps and pus-filled spots. Occasionally, there may be some swelling of the facial skin (lymphoedema), especially around the eyes. Occasionally, an overgrowth of the oil-secreting glands on the nose may cause the nose to become enlarged, bulbous and red (called rhinophyma). Rhinophyma is more common in men than women. How will rosacea be diagnosed? Rosacea can be diagnosed by its appearance. Specific tests are not usually required. Can rosacea be cured? No, but long-term treatments can be helpful. How can rosacea be treated? The inflammation that accompanies rosacea can be treated with preparations applied to the skin or taken by mouth; however, not all these will help the redness or blushing that may be associated with rosacea. Local applications: The inflammatory element of rosacea may be controlled with a medication applied to the affected areas It takes at least 8 weeks for their effect to become evident and some applications work specifically to reduce the redness associated with rosacea     Oral antibiotics:     These are helpful for the inflammatory element of moderate or severe rosacea. The most commonly used antibiotics belong to the tetracycline group and include tetracycline, oxytetracycline, doxycycline, lymecycline and minocycline. Erythromycin is another commonly used antibiotic. The duration of an antibiotic course depends on your response. Your doctor may suggest that you use a cream and oral treatment together. Other treatments: An eye specialist should manage the severe types of eye involvement. A bulbous nose affected by rhinophyma can be reduced by a dermatologist or a plastic surgeon  Redness and dilated blood vessels can be treated with laser therapy. by a dermatologist.  A beta-blocker tablet or clonidine may be prescribed by the dermatologist it may help if blushing is a significant problem. Isotretinoin tablets are sometimes prescribed by a dermatologist for severe rosacea. Self Care (What can I do?)    --Protect your skin from the sun by using a sun block (with a sun protection factor of at least 30) on your face every day and needs re-applying frequently if outdoors. --Do not rub or scrub your face when cleansing as this can make rosacea worse. --Do not use perfumed soap as this can make rosacea worse. --Use a soap substitute (emollient) to cleanse your face. --Use an unperfumed moisturiser on a regular basis if your skin is dry or sensitive. --Consider the lifestyle factors that can worsen rosacea   and avoid them; a written record of your flare-ups may help. --Some Cosmetics can often cover up rosacea effectively, and some rosacea patients may benefit from the use of skin camouflage. This may help hide excessive redness. --Unless they are specifically recommended to you by your dermatologist it may be best to avoid some treatments for acne, as they can irritate skin that is prone to rosacea.   --Do not use topical preparations containing corticosteroids, unless specifically recommended by your dermatologist as these may make rosacea worse in the long run. --If your eyes are affected, do not ignore them - consult your dermatologist or an eye specialist doctor. --Some drugs can aggravate blushing, and your doctor or dermatologist may make appropriate changes to your medication. Source: [facility]       I, Nidhi Marinelli, personally scribed the services dictated to me by Dr. Yolette Parr in this documentation. I, Dr. Yolette Parr, personally performed the services described in this documentation, as scribed by Nidhi Marinelli in my presence, and it is both accurate and complete.     Electronically signed by Pablo Mayen MD on 12/19/2022 at 9:31 AM

## 2022-12-19 ENCOUNTER — OFFICE VISIT (OUTPATIENT)
Dept: DERMATOLOGY | Age: 62
End: 2022-12-19
Payer: COMMERCIAL

## 2022-12-19 VITALS
HEART RATE: 60 BPM | TEMPERATURE: 97.9 F | DIASTOLIC BLOOD PRESSURE: 50 MMHG | SYSTOLIC BLOOD PRESSURE: 106 MMHG | WEIGHT: 161.6 LBS | OXYGEN SATURATION: 98 % | BODY MASS INDEX: 23.86 KG/M2

## 2022-12-19 DIAGNOSIS — L71.9 ROSACEA: Primary | ICD-10-CM

## 2022-12-19 PROCEDURE — G8420 CALC BMI NORM PARAMETERS: HCPCS | Performed by: DERMATOLOGY

## 2022-12-19 PROCEDURE — 99214 OFFICE O/P EST MOD 30 MIN: CPT | Performed by: DERMATOLOGY

## 2022-12-19 PROCEDURE — G8484 FLU IMMUNIZE NO ADMIN: HCPCS | Performed by: DERMATOLOGY

## 2022-12-19 PROCEDURE — G8427 DOCREV CUR MEDS BY ELIG CLIN: HCPCS | Performed by: DERMATOLOGY

## 2022-12-19 PROCEDURE — 3017F COLORECTAL CA SCREEN DOC REV: CPT | Performed by: DERMATOLOGY

## 2022-12-19 PROCEDURE — 1036F TOBACCO NON-USER: CPT | Performed by: DERMATOLOGY

## 2022-12-19 RX ORDER — CEPHALEXIN 500 MG/1
CAPSULE ORAL
COMMUNITY

## 2022-12-19 RX ORDER — POLYMYXIN B SULFATE AND TRIMETHOPRIM 1; 10000 MG/ML; [USP'U]/ML
SOLUTION OPHTHALMIC
COMMUNITY

## 2022-12-19 RX ORDER — NITROFURANTOIN 25; 75 MG/1; MG/1
CAPSULE ORAL
COMMUNITY

## 2022-12-19 RX ORDER — METHYLPREDNISOLONE 4 MG/1
TABLET ORAL
COMMUNITY

## 2022-12-19 RX ORDER — PREDNISONE 20 MG/1
TABLET ORAL
COMMUNITY

## 2022-12-19 NOTE — PATIENT INSTRUCTIONS
Tips for rosacea sufferers:    What to look for in sunscreen:  Zinc oxide, titanium dioxide, or both  Silicone (may be listed as dimethicone, orcyclomethicone, or cyclomethicone)  No fragrance (label may say fragrance free, but if it says unscented choose another sunscreen)  Broad-spectrum protection  SPF 30 or higher    What to look for in moisturizers & sunscreen:  Hyaluronic acid  Niacinamide  Licorice (glycyrrhiza glabra, Licochalcone A)   Ceramides  Silicone (may be listed as dimethicone, orcyclomethicone, or cyclomethicone)    What to avoid in products  Fragrance  Menthol  Eucalyptus   Essential oils  Camphor  Alcohol   Peppermint  Witch Hazel      Some fragrance names  Balsam of peru (myroxylon pereirae)  Cinnamic aldehyde  Cinnamyl alcohol  Hydroxycitronellal  Isoeugenol  Eugenol  Oak bro absolute  Oil of Bergamot  alpha-amyl cinnamic aldehyde  geraniol  Linalool  Lavender  Perfumes  Toilet water  Colognes  Masking perfumes  Unscented perfumes  Aroma chemicals  Essential oils  Gallatin oil  Cinnamon  Cloves  Citronella   Ethylene bassylate  Sweet Orange  Sweet Basil  Citrus   Lemon peel  Limonene       ROSACEA    What is rosacea? Rosacea is a common skin condition, usually occurring on the face, which predominantly affects fair-skinned but may affect all skin types in people aged 36to 61years old. It is more common in women but when affecting men, it may be more severe. It is a chronic condition, and can persist for a long time and, in any individual, the severity tends to fluctuate. Rosacea tends to affect the cheeks, forehead, chin and nose, and is characterised by persistent redness caused by dilated blood vessels, small bumps and pus-filled spots similar to acne There may also be uncomfortable inflammation of the surface of the eyes and eyelids. Rosacea is classified into 4 subtypes that may overlap. Your doctor will advise you of the type you have. What causes rosacea?       The cause of rosacea is not fully understood. Your genetics, immune system factors, and environmental factors may all play a part. Factors that trigger rosacea cause the blood vessels in the skin of the face to enlarge (dilate). The theory that rosacea is due to bacteria on the skin or in the gut has not been proven. However, antibiotics have proven helpful to treat rosacea. This is because of their anti-inflammatory effect. Rosacea is not contagious. There are a variety of triggers that may make rosacea worse. These include alcohol, exercise, high and low temperatures, hot drinks, spicy foods and stress. Rosacea can be sun sensitive. Is rosacea hereditary? Rosacea does seem to run in some families, but there is no clear genetic link. What are the symptoms of rosacea? The rash and the blushing associated with rosacea can lead to embarrassment, lowered self-esteem and self-confidence, anxiety and even depression. Furthermore, the skin of the face is often sensitive, and the affected area can feel very hot or sting. Some people with rosacea have eye symptoms. A few patients with rosacea may develop more serious eye problems, such as painful inflammation involving the front part of the eye (rosacea keratitis) and this may cause blurred vision. It is important that you consult a dermatologist or an    if you develop symptoms affecting the eyes. What does rosacea look like? Rosacea usually starts with a tendency to blush easily. After a while, the central areas of the face become a permanent deeper shade of red, with small dilated blood vessels, bumps and pus-filled spots. Occasionally, there may be some swelling of the facial skin (lymphoedema), especially around the eyes. Occasionally, an overgrowth of the oil-secreting glands on the nose may cause the nose to become enlarged, bulbous and red (called rhinophyma). Rhinophyma is more common in men than women.     How will rosacea be diagnosed? Rosacea can be diagnosed by its appearance. Specific tests are not usually required. Can rosacea be cured? No, but long-term treatments can be helpful. How can rosacea be treated? The inflammation that accompanies rosacea can be treated with preparations applied to the skin or taken by mouth; however, not all these will help the redness or blushing that may be associated with rosacea. Local applications: The inflammatory element of rosacea may be controlled with a medication applied to the affected areas It takes at least 8 weeks for their effect to become evident and some applications work specifically to reduce the redness associated with rosacea     Oral antibiotics: These are helpful for the inflammatory element of moderate or severe rosacea. The most commonly used antibiotics belong to the tetracycline group and include tetracycline, oxytetracycline, doxycycline, lymecycline and minocycline. Erythromycin is another commonly used antibiotic. The duration of an antibiotic course depends on your response. Your doctor may suggest that you use a cream and oral treatment together. Other treatments: An eye specialist should manage the severe types of eye involvement. A bulbous nose affected by rhinophyma can be reduced by a dermatologist or a plastic surgeon  Redness and dilated blood vessels can be treated with laser therapy. by a dermatologist.  A beta-blocker tablet or clonidine may be prescribed by the dermatologist it may help if blushing is a significant problem. Isotretinoin tablets are sometimes prescribed by a dermatologist for severe rosacea. Self Care (What can I do?)    --Protect your skin from the sun by using a sun block (with a sun protection factor of at least 30) on your face every day and needs re-applying frequently if outdoors. --Do not rub or scrub your face when cleansing as this can make rosacea worse.   --Do not use perfumed soap as this can make rosacea worse. --Use a soap substitute (emollient) to cleanse your face. --Use an unperfumed moisturiser on a regular basis if your skin is dry or sensitive. --Consider the lifestyle factors that can worsen rosacea   and avoid them; a written record of your flare-ups may help. --Some Cosmetics can often cover up rosacea effectively, and some rosacea patients may benefit from the use of skin camouflage. This may help hide excessive redness. --Unless they are specifically recommended to you by your dermatologist it may be best to avoid some treatments for acne, as they can irritate skin that is prone to rosacea. --Do not use topical preparations containing corticosteroids, unless specifically recommended by your dermatologist as these may make rosacea worse in the long run. --If your eyes are affected, do not ignore them - consult your dermatologist or an eye specialist doctor. --Some drugs can aggravate blushing, and your doctor or dermatologist may make appropriate changes to your medication.     Source: [facility]

## 2023-03-01 ENCOUNTER — OFFICE VISIT (OUTPATIENT)
Dept: DERMATOLOGY | Age: 63
End: 2023-03-01

## 2023-03-01 VITALS
HEIGHT: 69 IN | BODY MASS INDEX: 23.99 KG/M2 | OXYGEN SATURATION: 96 % | TEMPERATURE: 97.5 F | WEIGHT: 162 LBS | SYSTOLIC BLOOD PRESSURE: 129 MMHG | HEART RATE: 65 BPM | DIASTOLIC BLOOD PRESSURE: 70 MMHG

## 2023-03-01 DIAGNOSIS — L73.2 HIDRADENITIS SUPPURATIVA: Primary | ICD-10-CM

## 2023-03-01 RX ORDER — DOXYCYCLINE HYCLATE 100 MG/1
CAPSULE ORAL
Qty: 60 CAPSULE | Refills: 0 | Status: SHIPPED | OUTPATIENT
Start: 2023-03-01

## 2023-03-01 RX ORDER — CLINDAMYCIN PHOSPHATE 10 UG/ML
LOTION TOPICAL
Qty: 60 ML | Refills: 3 | Status: SHIPPED | OUTPATIENT
Start: 2023-03-01

## 2023-03-01 RX ORDER — ESTRADIOL 0.1 MG/G
CREAM VAGINAL
COMMUNITY
Start: 2023-01-21

## 2023-03-01 NOTE — PROGRESS NOTES
Dermatology Patient Note  [facility], [facility] DERMATOLOGY  [address]  [address]  [facility] [address]  Dept: [phone]  Dept Fax: [phone]      VISITDATE: 3/1/2023   REFERRING PROVIDER: No ref. provider found      Indu Owens is a 62 y.o. female  who presents today in the office for:    Follow-up (Pt states she has an active boil on her lt buttocks in the crack that busted open last night but has been on going for 7 mos )      HISTORY OF PRESENT ILLNESS:  Patient presents for evaluation of boil on left gluteal cleft, ongoing for approximately 7 months. The boil spontaneously drained last night. She notes a similar boil >30 years ago on the buttocks, and reports occasional painful nodules in the axillae for the past year. The patient has tried tea tree oil and rubbing alcohol on the areas without improvement.     MEDICAL PROBLEMS:  There are no problems to display for this patient.      CURRENT MEDICATIONS:   Current Outpatient Medications   Medication Sig Dispense Refill    estradiol (ESTRACE) 0.1 MG/GM vaginal cream apply 1 gram with finger to genital area inside labia at bedtime for 2 weeks, then use 1 gram twice weekly      trimethoprim-polymyxin b (POLYTRIM) 22626-5.1 UNIT/ML-% ophthalmic solution       predniSONE (DELTASONE) 20 MG tablet       azelaic acid (AZELEX) 20 % cream Apply to face twice daily 30 g 11    SYNTHROID 50 MCG tablet TAKE 1 TABLET BY MOUTH IN THE MORNING ON AN EMPTY STOMACH      liothyronine (CYTOMEL) 5 MCG tablet take 1 tablet by mouth once daily ON AN EMPTY STOMACH      thyroid (ARMOUR THYROID) 15 MG tablet Take 1 tablet by mouth daily 90 tablet 1    B COMPLEX VITAMINS PO Vitamins B Complex      Cholecalciferol (VITAMIN D3) 3000 units TABS Vitamin D3      MAGNESIUM CITRATE PO Take by mouth      Omega-3 Fatty Acids (FISH OIL) 1000 MG CAPS       methylPREDNISolone (MEDROL) 4 MG tablet methylprednisolone 4 mg tablets in  a dose pack   TAKE ONE ROW OF TABLETS EACH DAY AS INSTRUCTED ON THE PACKAGE; take after a meal (Patient not taking: No sig reported)      cephALEXin (KEFLEX) 500 MG capsule cephalexin 500 mg capsule   TAKE 1 CAPSULE BY MOUTH FOUR TIMES A DAY FOR 10 DAYS (Patient not taking: No sig reported)      Cyanocobalamin 3000 MCG/ML LIQD Place 1 drop under the tongue daily (Patient not taking: No sig reported)      nitrofurantoin, macrocrystal-monohydrate, (MACROBID) 100 MG capsule nitrofurantoin monohydrate/macrocrystals 100 mg capsule   TAKE 1 CAPSULE BY MOUTH TWO TIMES A DAY FOR 5 DAYS (Patient not taking: No sig reported)      chlorhexidine (PERIDEX) 0.12 % solution RINSE WITH 1/2 OUNCE BY MOUTH TWICE DAILY FOR 30 SECONDS. (Patient not taking: No sig reported)      Multiple Vitamins-Minerals (MULTIVITAMIN ADULT PO) multivitamin (Patient not taking: No sig reported)      Copper Gluconate (COPPER CAPS PO) copper   1 tablet daily (Patient not taking: No sig reported)      COD LIVER OIL PO Take by mouth (Patient not taking: No sig reported)      Chromic Chloride POWD Take by mouth (Patient not taking: No sig reported)      NONFORMULARY Indications: Zinc 25 mg  (Patient not taking: No sig reported)       No current facility-administered medications for this visit. ALLERGIES:   Allergies   Allergen Reactions    Terconazole        SOCIAL HISTORY:  Social History     Tobacco Use    Smoking status: Never    Smokeless tobacco: Never   Substance Use Topics    Alcohol use: Yes       Pertinent ROS:  Review of Systems  Skin: Denies any new changing, growing or bleeding lesions or rashes except as described in the HPI   Constitutional: Denies fevers, chills, and malaise.     PHYSICAL EXAM:   /70   Pulse 65   Temp 97.5 °F (36.4 °C) (Temporal)   Ht 5' 9\" (1.753 m)   Wt 162 lb (73.5 kg)   SpO2 96%   BMI 23.92 kg/m²     The patient is generally well appearing, well nourished, alert and conversational. Affect is normal.    Cutaneous Exam:  Physical Exam  Focused exam of buttocks was performed    Facial covering was not removed during examination. Diagnoses/exam findings/medical history pertinent to this visit are listed below:    Assessment:   Diagnosis Orders   1. Hidradenitis suppurativa             Plan:  Hidradenitis suppurativa, single nodule on left buttock, possible prior flares of axillae  - chronic illness with progression and/or exacerbation   - discussed diagnosis, etiology, natural course, and treatment options  - start doxycycline 100 mg BID x 1 month   - benzoyl peroxide 5% wash to affected areas daily (OTC examples in AVS)  - clindamycin 1% lotion to affected area daily  - Patient counseled regarding side effects of doxycycline, including photosensitivity, gastrointestinal upset, chemical esophagitis, teratogenicity and severe drug reaction including pseudotumor cerebri. Patient also counseled to take doxycycline with a full glass of water. Intralesional Injection: After discussion of risks including atrophy and dyspigmentation, patient gives verbal consent to proceed. After cleansing with alcohol the HS nodules on the left buttock were injected with 0.2 ml of Kenalog 10 mg/mL      RTC 3 months     Future Appointments   Date Time Provider Monse Dillon   1/15/2024  9:00 AM Aramis Steiner MD  derm [facility]         Patient Instructions   Wash affected areas daily with benzoyl peroxide wash, then apply clindamycin lotion daily. Start doxycycline twice daily as prescribed. Do not lay down for at least 1 hour after taking doxycycline, as it can cause a pill esophagitis. Avoid excessive dairy products for at least 2 hours after taking doxycycline as it will cause the medication to become inactive. You can not get pregnant while on this medication as it can cause birth defects. This medication can make your skin sensitive to the sun so be sure to use sunscreen.  If you develop a persistent headache or vision changes, stop the medication and call the office. Sun Protection     There are two types of sun rays that are harmful to the skin. UVA rays cause skin aging and skin cancer, such as melanoma. UVB rays cause sunburns, cataracts, and also contribute to skin cancer. The American-Academy of Dermatology recommends that children and adults wear a broad spectrum, waterproof sunscreen with a Sun Protection Factor (SPF) of 30 or higher. It is important to check the ingredient label to be sure the sunscreen will protect the skin from both UVA and UVB sunrays. Your sunscreen should contain at least one of the following ingredients: titanium dioxide, zinc oxide, or avobenzone. Sunscreen will not be effective unless it is applied to all exposed skin. Sunscreens work best if they are applied 30 minutes before sun exposure. They should be reapplied every 2 hours and after any water exposure. Sunscreen is not perfect. It is important to use other methods to protect the skin from sun exposure also. Wear hats, sunglasses and other sun protective clothing when outdoors. Stay in the shade during the peak hours of sun exposure between 10 AM and 4 PM.      I, Vane Valencia, personally scribed the services dictated to me by Dr. Len Holliday in this documentation. I, Dr. Len Holliday, personally performed the services described in this documentation, as scribed by [facility] in my presence, and it is both accurate and complete.     Electronically signed by Favio Kauffman MD on 3/1/2023 at 2:04 PM

## 2023-03-01 NOTE — PATIENT INSTRUCTIONS
Wash affected areas daily with benzoyl peroxide wash, then apply clindamycin lotion daily. Start doxycycline twice daily as prescribed. Do not lay down for at least 1 hour after taking doxycycline, as it can cause a pill esophagitis. Avoid excessive dairy products for at least 2 hours after taking doxycycline as it will cause the medication to become inactive. You can not get pregnant while on this medication as it can cause birth defects. This medication can make your skin sensitive to the sun so be sure to use sunscreen. If you develop a persistent headache or vision changes, stop the medication and call the office. Sun Protection     There are two types of sun rays that are harmful to the skin. UVA rays cause skin aging and skin cancer, such as melanoma. UVB rays cause sunburns, cataracts, and also contribute to skin cancer. The American-Academy of Dermatology recommends that children and adults wear a broad spectrum, waterproof sunscreen with a Sun Protection Factor (SPF) of 30 or higher. It is important to check the ingredient label to be sure the sunscreen will protect the skin from both UVA and UVB sunrays. Your sunscreen should contain at least one of the following ingredients: titanium dioxide, zinc oxide, or avobenzone. Sunscreen will not be effective unless it is applied to all exposed skin. Sunscreens work best if they are applied 30 minutes before sun exposure. They should be reapplied every 2 hours and after any water exposure. Sunscreen is not perfect. It is important to use other methods to protect the skin from sun exposure also. Wear hats, sunglasses and other sun protective clothing when outdoors.   Stay in the shade during the peak hours of sun exposure between 10 AM and 4 PM.

## 2023-06-27 ENCOUNTER — OFFICE VISIT (OUTPATIENT)
Dept: DERMATOLOGY | Age: 63
End: 2023-06-27
Payer: COMMERCIAL

## 2023-06-27 VITALS
HEART RATE: 57 BPM | DIASTOLIC BLOOD PRESSURE: 67 MMHG | SYSTOLIC BLOOD PRESSURE: 114 MMHG | TEMPERATURE: 97.9 F | OXYGEN SATURATION: 100 % | WEIGHT: 163.2 LBS | BODY MASS INDEX: 24.1 KG/M2

## 2023-06-27 DIAGNOSIS — L73.2 HIDRADENITIS SUPPURATIVA: Primary | ICD-10-CM

## 2023-06-27 DIAGNOSIS — L71.9 ROSACEA: ICD-10-CM

## 2023-06-27 PROCEDURE — G8427 DOCREV CUR MEDS BY ELIG CLIN: HCPCS | Performed by: DERMATOLOGY

## 2023-06-27 PROCEDURE — 99213 OFFICE O/P EST LOW 20 MIN: CPT | Performed by: DERMATOLOGY

## 2023-06-27 PROCEDURE — 1036F TOBACCO NON-USER: CPT | Performed by: DERMATOLOGY

## 2023-06-27 PROCEDURE — G8420 CALC BMI NORM PARAMETERS: HCPCS | Performed by: DERMATOLOGY

## 2023-06-27 PROCEDURE — 3017F COLORECTAL CA SCREEN DOC REV: CPT | Performed by: DERMATOLOGY

## 2023-06-27 RX ORDER — CLINDAMYCIN PHOSPHATE 10 UG/ML
LOTION TOPICAL
Qty: 60 ML | Refills: 3 | Status: SHIPPED | OUTPATIENT
Start: 2023-06-27

## 2024-01-10 NOTE — PROGRESS NOTES
Dermatology Patient Note  [facility], [facility] DERMATOLOGY  [address]  [address]  [facility] [address]  Dept: [phone]  Dept Fax: [phone]      VISITDATE: 1/15/2024   REFERRING PROVIDER: No ref. provider found      Indu Owens is a 63 y.o. female  who presents today in the office for:    Other (FBSC-Fm hx of basal cell carcinoma-mother. LV-06/27/23. No new concerns )      HISTORY OF PRESENT ILLNESS:  Patient presents for FBSC. She was last seen on 6/27/23 for HS and rosacea. She was continued on BPO wash, clindamycin lotion, and azelaic acid. Family hx of BCC in her mother.     Patient has no lesions of concern at today's visit. Her rosacea and HS has been stable. She has had more skin tags appearing.     Her initial flares of HS were on the axillae and buttocks. She states that since treating the lesions she has not had a flare up.     She reports that the pink lesion on the right abdomen appeared this morning. The area blistered when wiped. Last night she went to a [facility].     MEDICAL PROBLEMS:  There are no problems to display for this patient.      CURRENT MEDICATIONS:   Current Outpatient Medications   Medication Sig Dispense Refill    benzoyl peroxide 5 % external liquid Wash affected areas once daily 227 g 3    clindamycin (CLEOCIN T) 1 % lotion Apply to affected areas daily 60 mL 3    estradiol (ESTRACE) 0.1 MG/GM vaginal cream apply 1 gram with finger to genital area inside labia at bedtime for 2 weeks, then use 1 gram twice weekly      nitrofurantoin, macrocrystal-monohydrate, (MACROBID) 100 MG capsule nitrofurantoin monohydrate/macrocrystals 100 mg capsule   TAKE 1 CAPSULE BY MOUTH TWO TIMES A DAY FOR 5 DAYS      trimethoprim-polymyxin b (POLYTRIM) 95122-3.1 UNIT/ML-% ophthalmic solution       predniSONE (DELTASONE) 20 MG tablet       azelaic acid (AZELEX) 20 % cream Apply to face twice daily 30 g 11    SYNTHROID 50

## 2024-01-15 ENCOUNTER — OFFICE VISIT (OUTPATIENT)
Dept: DERMATOLOGY | Age: 64
End: 2024-01-15
Payer: COMMERCIAL

## 2024-01-15 VITALS
DIASTOLIC BLOOD PRESSURE: 63 MMHG | HEIGHT: 69 IN | TEMPERATURE: 97.8 F | HEART RATE: 65 BPM | WEIGHT: 158 LBS | OXYGEN SATURATION: 99 % | SYSTOLIC BLOOD PRESSURE: 103 MMHG | BODY MASS INDEX: 23.4 KG/M2

## 2024-01-15 DIAGNOSIS — L73.2 HIDRADENITIS SUPPURATIVA: Primary | ICD-10-CM

## 2024-01-15 DIAGNOSIS — D22.9 MULTIPLE NEVI: ICD-10-CM

## 2024-01-15 DIAGNOSIS — L82.1 SEBORRHEIC KERATOSES: ICD-10-CM

## 2024-01-15 DIAGNOSIS — Z80.8 FAMILY HISTORY OF NONMELANOMA SKIN CANCER: ICD-10-CM

## 2024-01-15 DIAGNOSIS — D23.9 DERMATOFIBROMA: ICD-10-CM

## 2024-01-15 DIAGNOSIS — D18.01 CHERRY ANGIOMA: ICD-10-CM

## 2024-01-15 DIAGNOSIS — L30.8 OTHER SPECIFIED DERMATITIS: ICD-10-CM

## 2024-01-15 DIAGNOSIS — L71.9 ROSACEA: ICD-10-CM

## 2024-01-15 PROCEDURE — G8420 CALC BMI NORM PARAMETERS: HCPCS | Performed by: DERMATOLOGY

## 2024-01-15 PROCEDURE — 3017F COLORECTAL CA SCREEN DOC REV: CPT | Performed by: DERMATOLOGY

## 2024-01-15 PROCEDURE — 99213 OFFICE O/P EST LOW 20 MIN: CPT | Performed by: DERMATOLOGY

## 2024-01-15 PROCEDURE — G8484 FLU IMMUNIZE NO ADMIN: HCPCS | Performed by: DERMATOLOGY

## 2024-01-15 PROCEDURE — G8428 CUR MEDS NOT DOCUMENT: HCPCS | Performed by: DERMATOLOGY

## 2024-01-15 PROCEDURE — 1036F TOBACCO NON-USER: CPT | Performed by: DERMATOLOGY

## 2024-01-15 RX ORDER — CLINDAMYCIN PHOSPHATE 10 UG/ML
LOTION TOPICAL
Qty: 60 ML | Refills: 11 | Status: SHIPPED | OUTPATIENT
Start: 2024-01-15

## 2024-01-15 NOTE — PATIENT INSTRUCTIONS
childhood or adulthood. These are called acquired moles. They are most common on sun exposed areas of skin such as the face, neck, upper body, arms and legs.    CHECKING MOLES  Most moles are harmless, but in rare cases moles may become cancerous. Checking moles and looking for changes is an important step in helping to catch worrisome changes early. Some changes to look for are asymmetry (moles that do not look the same on each half), irregular shapes or borders, uneven color or large size. Also look for any moles that bleed, itch, or become painful. Looking at your skin regularly can help you recognize moles that are more at risk for becoming cancerous.     WHEN TO CALL THE DOCTOR  Call your doctor if you see any of the following changes in a mole:       Irregular borders (uneven shape or edges)       Changes in color to black, blue or red.       Changes in the surface texture       Scabs, scaling, irritation or bleeding in the mole    TREATMENT FOR MOLES  Often we can simply look at your moles and tell you if they look worrisome.  If we are not concerned about the look of your moles at your appointment, we may measure some moles and take some photos that will allow us to watch for future changes in the moles.     TREATMENT FOR MOLES  If a mole is getting irritated frequently, bleeding, difficult to watch due to location or dark color, atypical in appearance, or worrisome, we may perform a skin biopsy. A skin biopsy is a procedure that involves removing the mole so that it can be looked at under a microscope. There are many methods used to remove moles. The method we choose depends on the location of the mole, the size of the mole, and the amount of concern for skin cancer. Generally, removing moles in the dermatologist’s office is a simple and safe procedure that can be done with local anesthesia.    PREVENTION  You can do some things to prevent moles from becoming cancerous:       Try to avoid long periods of

## 2025-01-15 ENCOUNTER — OFFICE VISIT (OUTPATIENT)
Age: 65
End: 2025-01-15
Payer: COMMERCIAL

## 2025-01-15 VITALS
BODY MASS INDEX: 26.07 KG/M2 | TEMPERATURE: 97.2 F | HEIGHT: 69 IN | WEIGHT: 176 LBS | OXYGEN SATURATION: 98 % | HEART RATE: 60 BPM | SYSTOLIC BLOOD PRESSURE: 115 MMHG | DIASTOLIC BLOOD PRESSURE: 78 MMHG

## 2025-01-15 DIAGNOSIS — L71.9 ROSACEA: ICD-10-CM

## 2025-01-15 DIAGNOSIS — L91.8 ACROCHORDON: ICD-10-CM

## 2025-01-15 DIAGNOSIS — Z85.820 HISTORY OF MELANOMA: ICD-10-CM

## 2025-01-15 DIAGNOSIS — D23.9 INTRADERMAL NEVUS: ICD-10-CM

## 2025-01-15 DIAGNOSIS — D22.9 MULTIPLE NEVI: ICD-10-CM

## 2025-01-15 DIAGNOSIS — L73.2 HIDRADENITIS SUPPURATIVA: ICD-10-CM

## 2025-01-15 DIAGNOSIS — D23.9 DERMATOFIBROMA: ICD-10-CM

## 2025-01-15 DIAGNOSIS — L82.1 SEBORRHEIC KERATOSES: ICD-10-CM

## 2025-01-15 DIAGNOSIS — Z80.8 FAMILY HISTORY OF NONMELANOMA SKIN CANCER: Primary | ICD-10-CM

## 2025-01-15 PROCEDURE — 1036F TOBACCO NON-USER: CPT | Performed by: DERMATOLOGY

## 2025-01-15 PROCEDURE — 99212 OFFICE O/P EST SF 10 MIN: CPT | Performed by: DERMATOLOGY

## 2025-01-15 PROCEDURE — 3017F COLORECTAL CA SCREEN DOC REV: CPT | Performed by: DERMATOLOGY

## 2025-01-15 PROCEDURE — G8419 CALC BMI OUT NRM PARAM NOF/U: HCPCS | Performed by: DERMATOLOGY

## 2025-01-15 PROCEDURE — 99213 OFFICE O/P EST LOW 20 MIN: CPT | Performed by: DERMATOLOGY

## 2025-01-15 PROCEDURE — G8427 DOCREV CUR MEDS BY ELIG CLIN: HCPCS | Performed by: DERMATOLOGY

## 2025-01-15 NOTE — PATIENT INSTRUCTIONS
Hidradenitis suppurativa of axillae and buttock   - continue BPO wash and clindamycin lotion daily      Rosacea, papulopustular   - continue azelaic acid    Sun Protection     There are two types of sun rays that are harmful to the skin.  UVA rays cause skin aging and skin cancer, such as melanoma.  UVB rays cause sunburns, cataracts, and also contribute to skin cancer.    The American-Academy of Dermatology recommends that children and adults wear a broad spectrum, waterproof sunscreen with a Sun Protection Factor (SPF) of 30 or higher.  It is important to check the ingredient label to be sure the sunscreen will protect the skin from both UVA and UVB sunrays.  Your sunscreen should contain at least one of the following ingredients: titanium dioxide, zinc oxide, or avobenzone.    Sunscreen will not be effective unless it is applied to all exposed skin.  Sunscreens work best if they are applied 30 minutes before sun exposure.  They should be reapplied every 2 hours and after any water exposure.    Sunscreen is not perfect.  It is important to use other methods to protect the skin from sun exposure also.  Wear hats, sunglasses and other sun protective clothing when outdoors.  Stay in the shade during the peak hours of sun exposure between 10 AM and 4 PM.

## 2025-01-15 NOTE — PROGRESS NOTES
Dermatology Patient Note  [facility]  [facility] DERMATOLOGY  [address] [place] [address]  Dept: [phone]  Dept Fax: [phone]      VISITDATE: 1/15/2025   REFERRING PROVIDER: No ref. provider found      Indu Owens is a 64 y.o. female  who presents today in the office for:    Other (Patient presents in the office for a 1 year FBSE-  No personal hx of skin cancer but mother and father have both had skin cancer. )      HISTORY OF PRESENT ILLNESS:  As above. Patient presents for a 1 year FBSC. Family history of NM skin cancer, no personal history. At the last visit 1/15/24, patient was continued on BPO wash and clindamycin lotion for HS of the axillae and buttocks. Also continued on azelaic acid for papulopustular rosacea. Pink vesicle of tight abdomen photographed to monitor.     Patient is doing well today. HS and rosacea are controlled. Reports no HS flares since LV. She has a couple skin tags on the axillae she would like looked at. Has a family history of insulin resistance. PCP visit showed she was borderline diabetic. Since starting to control her diet, she has noticed improvement in her skin. Currently uses BPO wash and azelaic acid.     MEDICAL PROBLEMS:  There are no problems to display for this patient.      CURRENT MEDICATIONS:   Current Outpatient Medications   Medication Sig Dispense Refill    benzoyl peroxide 5 % external liquid Wash affected areas once daily 227 g 11    clindamycin (CLEOCIN T) 1 % lotion Apply to affected areas daily 60 mL 11    estradiol (ESTRACE) 0.1 MG/GM vaginal cream apply 1 gram with finger to genital area inside labia at bedtime for 2 weeks, then use 1 gram twice weekly      azelaic acid (AZELEX) 20 % cream Apply to face twice daily 30 g 11    SYNTHROID 50 MCG tablet TAKE 1 TABLET BY MOUTH IN THE MORNING ON AN EMPTY STOMACH      liothyronine (CYTOMEL) 5 MCG tablet take 1 tablet by mouth once daily ON AN EMPTY STOMACH      B

## 2025-07-08 ENCOUNTER — OFFICE VISIT (OUTPATIENT)
Dept: PODIATRY | Age: 65
End: 2025-07-08
Payer: COMMERCIAL

## 2025-07-08 VITALS — HEIGHT: 70 IN | WEIGHT: 150 LBS | BODY MASS INDEX: 21.47 KG/M2

## 2025-07-08 DIAGNOSIS — M79.605 PAIN IN BOTH LOWER EXTREMITIES: ICD-10-CM

## 2025-07-08 DIAGNOSIS — M20.42 HAMMER TOES OF BOTH FEET: Primary | ICD-10-CM

## 2025-07-08 DIAGNOSIS — M20.41 HAMMER TOES OF BOTH FEET: Primary | ICD-10-CM

## 2025-07-08 DIAGNOSIS — M21.611 BUNION, RIGHT: ICD-10-CM

## 2025-07-08 DIAGNOSIS — E11.51 TYPE II DIABETES MELLITUS WITH PERIPHERAL CIRCULATORY DISORDER (HCC): ICD-10-CM

## 2025-07-08 DIAGNOSIS — M79.604 PAIN IN BOTH LOWER EXTREMITIES: ICD-10-CM

## 2025-07-08 PROCEDURE — 2022F DILAT RTA XM EVC RTNOPTHY: CPT | Performed by: PODIATRIST

## 2025-07-08 PROCEDURE — 3046F HEMOGLOBIN A1C LEVEL >9.0%: CPT | Performed by: PODIATRIST

## 2025-07-08 PROCEDURE — 1036F TOBACCO NON-USER: CPT | Performed by: PODIATRIST

## 2025-07-08 PROCEDURE — 99203 OFFICE O/P NEW LOW 30 MIN: CPT | Performed by: PODIATRIST

## 2025-07-08 PROCEDURE — G8420 CALC BMI NORM PARAMETERS: HCPCS | Performed by: PODIATRIST

## 2025-07-08 PROCEDURE — 3017F COLORECTAL CA SCREEN DOC REV: CPT | Performed by: PODIATRIST

## 2025-07-08 PROCEDURE — G8427 DOCREV CUR MEDS BY ELIG CLIN: HCPCS | Performed by: PODIATRIST

## 2025-07-08 RX ORDER — ERGOCALCIFEROL 1.25 MG/1
CAPSULE, LIQUID FILLED ORAL
COMMUNITY
Start: 2025-04-08

## 2025-07-08 NOTE — PROGRESS NOTES
[facility]  [address] [place], [place] [id number]  Dept: [phone]    NEW PATIENT PROGRESS NOTE  Date of patient's visit: 7/8/2025  Patient's Name:  Indu Owens YOB: 1960            Patient Care Team:  Bridget Melchor DO as PCP - General (Family Medicine)  Elliott Arteaga DPM as Consulting Physician (Podiatry)        Chief Complaint   Patient presents with    New Patient     Establish care    Hammer Toe     2nd toe bl feet x 1 year         HPI:   Indu Owens is a 64 y.o. female who presents to the office today complaining of hammertoes bilateral feet 2nd toe.  Symptoms began 1 year(s) ago. Patient relates pain is Absent .  Pain is rated 0 out of 10 and is described as none.  Treatments prior to today's visit include: wearing berkenstock sandals.  Currently denies F/C/N/V. Pt's primary care physician is Bridget Melchor DO last seen July 8 2024.  Patient also relates to being diabetic.  Patient states her blood sugars are well-controlled.    Allergies   Allergen Reactions    Terconazole        Past Medical History:   Diagnosis Date    Hypothyroidism 8/22/2018       Prior to Admission medications    Medication Sig Start Date End Date Taking? Authorizing Provider   vitamin D (ERGOCALCIFEROL) 1.25 MG (80020 UT) CAPS capsule  4/8/25  Yes Jeff Yang MD   benzoyl peroxide 5 % external liquid Wash affected areas once daily 1/15/25  Yes Keisha Mcclellan MD   benzoyl peroxide 5 % external liquid Wash affected areas once daily 1/15/24  Yes Keisha Mcclellan MD   clindamycin (CLEOCIN T) 1 % lotion Apply to affected areas daily 1/15/24  Yes Keisha Mcclellan MD   estradiol (ESTRACE) 0.1 MG/GM vaginal cream apply 1 gram with finger to genital area inside labia at bedtime for 2 weeks, then use 1 gram twice weekly 1/21/23  Yes Jeff Yang MD   azelaic acid (AZELEX) 20 % cream Apply to face twice daily